# Patient Record
Sex: FEMALE | Race: WHITE | NOT HISPANIC OR LATINO
[De-identification: names, ages, dates, MRNs, and addresses within clinical notes are randomized per-mention and may not be internally consistent; named-entity substitution may affect disease eponyms.]

---

## 2020-04-07 ENCOUNTER — APPOINTMENT (OUTPATIENT)
Dept: GASTROENTEROLOGY | Facility: CLINIC | Age: 85
End: 2020-04-07

## 2020-04-09 ENCOUNTER — APPOINTMENT (OUTPATIENT)
Dept: INTERNAL MEDICINE | Facility: CLINIC | Age: 85
End: 2020-04-09

## 2020-08-05 ENCOUNTER — EMERGENCY (EMERGENCY)
Facility: HOSPITAL | Age: 85
LOS: 0 days | Discharge: HOME | End: 2020-08-05
Payer: MEDICARE

## 2020-08-05 VITALS
RESPIRATION RATE: 20 BRPM | SYSTOLIC BLOOD PRESSURE: 151 MMHG | TEMPERATURE: 98 F | OXYGEN SATURATION: 98 % | HEART RATE: 67 BPM | DIASTOLIC BLOOD PRESSURE: 61 MMHG

## 2020-08-05 DIAGNOSIS — Z88.0 ALLERGY STATUS TO PENICILLIN: ICD-10-CM

## 2020-08-05 DIAGNOSIS — I10 ESSENTIAL (PRIMARY) HYPERTENSION: ICD-10-CM

## 2020-08-05 DIAGNOSIS — Z11.59 ENCOUNTER FOR SCREENING FOR OTHER VIRAL DISEASES: ICD-10-CM

## 2020-08-05 DIAGNOSIS — M81.0 AGE-RELATED OSTEOPOROSIS WITHOUT CURRENT PATHOLOGICAL FRACTURE: ICD-10-CM

## 2020-08-05 DIAGNOSIS — Z88.1 ALLERGY STATUS TO OTHER ANTIBIOTIC AGENTS STATUS: ICD-10-CM

## 2020-08-05 PROCEDURE — 99283 EMERGENCY DEPT VISIT LOW MDM: CPT

## 2020-08-05 PROCEDURE — U0003: CPT

## 2020-08-05 PROCEDURE — 99282 EMERGENCY DEPT VISIT SF MDM: CPT

## 2020-08-05 NOTE — ED PROVIDER NOTE - NSFOLLOWUPINSTRUCTIONS_ED_ALL_ED_FT
How to get your Coronavirus (COVID-19) Testing Results:   Please be advised that you were tested for the coronavirus (COVID-19) in the Emergency Department at [facility].  You are to maintain self-quarantine procedures for 14 days until instructed otherwise by one of our healthcare agents. Please note that the test may take up to 2-4 days to result.  If you do not hear from us within 72 hours and you'd like to check on your results, you can call on of our coronavirus specialists at [address] [place] [address] (available 24/7).  Please DO NOT call the site where you received the test to obtain your results.

## 2020-08-05 NOTE — ED PROVIDER NOTE - OBJECTIVE STATEMENT
Pt presents for COVID testing required prior to moving into an assisted living pt is asymptomatic and has no acute complaints at this time.

## 2020-08-05 NOTE — ED PROVIDER NOTE - PATIENT PORTAL LINK FT
You can access the FollowMyHealth Patient Portal offered by [facility] by registering at the following website: [email or web address]. By joining [facility] [employer]’s FollowMyHealth portal, you will also be able to view your health information using other applications (apps) compatible with our system.

## 2020-08-05 NOTE — ED ADULT TRIAGE NOTE - CHIEF COMPLAINT QUOTE
Patient presents for COVID-19 testing; complains of possible covid exposure, requesting medical evaluation

## 2020-08-06 LAB — SARS-COV-2 RNA SPEC QL NAA+PROBE: SIGNIFICANT CHANGE UP

## 2020-08-25 ENCOUNTER — APPOINTMENT (OUTPATIENT)
Dept: GASTROENTEROLOGY | Facility: CLINIC | Age: 85
End: 2020-08-25
Payer: MEDICARE

## 2020-08-25 VITALS
HEIGHT: 59 IN | WEIGHT: 109 LBS | HEART RATE: 58 BPM | BODY MASS INDEX: 21.97 KG/M2 | DIASTOLIC BLOOD PRESSURE: 79 MMHG | OXYGEN SATURATION: 95 % | SYSTOLIC BLOOD PRESSURE: 202 MMHG

## 2020-08-25 LAB
ALBUMIN SERPL ELPH-MCNC: 4 G/DL
ALP BLD-CCNC: 108 U/L
ALT SERPL-CCNC: 22 U/L
ANION GAP SERPL CALC-SCNC: 9 MMOL/L
AST SERPL-CCNC: 27 U/L
BILIRUB SERPL-MCNC: 0.4 MG/DL
BUN SERPL-MCNC: 22 MG/DL
CALCIUM SERPL-MCNC: 9.5 MG/DL
CHLORIDE SERPL-SCNC: 107 MMOL/L
CO2 SERPL-SCNC: 28 MMOL/L
CREAT SERPL-MCNC: 0.71 MG/DL
CRP SERPL-MCNC: 0.17 MG/DL
GLUCOSE SERPL-MCNC: 87 MG/DL
MAGNESIUM SERPL-MCNC: 2.4 MG/DL
POTASSIUM SERPL-SCNC: 5 MMOL/L
PROT SERPL-MCNC: 6.1 G/DL
SODIUM SERPL-SCNC: 144 MMOL/L

## 2020-08-25 PROCEDURE — 99204 OFFICE O/P NEW MOD 45 MIN: CPT

## 2020-08-25 NOTE — HISTORY OF PRESENT ILLNESS
[de-identified] : Cindy is a very pleasant 87 year old female presenting for evaluation iarrhea abdominal pain.She has a diagnosis of Crohn's disease, diagnosed about 9 months ago. At that time, she was hospitalized with GI bleed. She is having diarrhea complicated by hematochezia. She underwent endoscopic evaluation and colonoscopy in [place]. She had, per patient and her son, diagnosis of Crohn's disease requiring initiation of stone or after a course of prednisone. She had been doing well. She received her infusion and started the injection therapy in [place].She now reports that for the past several weeks, she has had recurrent lower abdominal cramping pain and loose stool. Intermittent diarrhea and food intolerance. She had some bloating and decreased appetite, mainly feared that her diarrhea will come back As a result, she has been limiting for choices.She is here to establish care now that she will be residing in [place].\par \par

## 2020-08-25 NOTE — ASSESSMENT
[FreeTextEntry1] : Cindy is a very pleasant 87 year old female presenting for evaluation of  diarrhea and bloating. She has had lower abdominal discomfort associated with loose bowel movements. She had much more significant diarrhea about 9 months ago, which resulted in hospitalization for hematochezia. She was diagnosed at that time with Crohn's disease and placed on sclerae. She will likely need to resume this\par \par For what sounds like Crohn's colitis. However, I have her quested her records from [place] and we will wait to review both the endoscopy records and the pathology In addition, I wasn't sure that she is adequate immunization. Lastly, the first step will be to rule out colitis. She will have stool testing today in addition to lab testing. She is given instructions for a modified diet and we will await the results of the stool tests with further instructions.\par \par

## 2020-08-25 NOTE — PHYSICAL EXAM
[General Appearance - Alert] : alert [General Appearance - In No Acute Distress] : in no acute distress [Outer Ear] : the ears and nose were normal in appearance [Neck Cervical Mass (___cm)] : no neck mass was observed [Oropharynx] : the oropharynx was normal [Neck Appearance] : the appearance of the neck was normal [Thyroid Diffuse Enlargement] : the thyroid was not enlarged [Jugular Venous Distention Increased] : there was no jugular-venous distention [Thyroid Nodule] : there were no palpable thyroid nodules [Auscultation Breath Sounds / Voice Sounds] : lungs were clear to auscultation bilaterally [Heart Rate And Rhythm] : heart rate was normal and rhythm regular [Murmurs] : no murmurs [Heart Sounds Gallop] : no gallops [Heart Sounds] : normal S1 and S2 [Edema] : there was no peripheral edema [Full Pulse] : the pedal pulses are present [Heart Sounds Pericardial Friction Rub] : no pericardial rub [Bowel Sounds] : normal bowel sounds [Abdomen Soft] : soft [Abdomen Mass (___ Cm)] : no abdominal mass palpated [Abnormal Walk] : normal gait [Nail Clubbing] : no clubbing  or cyanosis of the fingernails [Musculoskeletal - Swelling] : no joint swelling seen [Skin Color & Pigmentation] : normal skin color and pigmentation [Motor Tone] : muscle strength and tone were normal [Skin Turgor] : normal skin turgor [Deep Tendon Reflexes (DTR)] : deep tendon reflexes were 2+ and symmetric [] : no rash [Sensation] : the sensory exam was normal to light touch and pinprick [Impaired Insight] : insight and judgment were intact [No Focal Deficits] : no focal deficits [Affect] : the affect was normal [Diffuse] : diffusely

## 2020-08-26 LAB
BASOPHILS # BLD AUTO: 0.03 K/UL
BASOPHILS NFR BLD AUTO: 0.4 %
ENDOMYSIUM IGA SER QL: NEGATIVE
ENDOMYSIUM IGA TITR SER: NORMAL
EOSINOPHIL # BLD AUTO: 0.23 K/UL
EOSINOPHIL NFR BLD AUTO: 2.9 %
HCT VFR BLD CALC: 45.8 %
HGB BLD-MCNC: 13.7 G/DL
IGA SER QL IEP: 245 MG/DL
IMM GRANULOCYTES NFR BLD AUTO: 0 %
LYMPHOCYTES # BLD AUTO: 2.86 K/UL
LYMPHOCYTES NFR BLD AUTO: 36.3 %
MAN DIFF?: NORMAL
MCHC RBC-ENTMCNC: 29 PG
MCHC RBC-ENTMCNC: 29.9 GM/DL
MCV RBC AUTO: 97 FL
MONOCYTES # BLD AUTO: 0.55 K/UL
MONOCYTES NFR BLD AUTO: 7 %
NEUTROPHILS # BLD AUTO: 4.21 K/UL
NEUTROPHILS NFR BLD AUTO: 53.4 %
PLATELET # BLD AUTO: 208 K/UL
RBC # BLD: 4.72 M/UL
RBC # FLD: 14.6 %
TTG IGA SER IA-ACNC: <1.2 U/ML
TTG IGA SER-ACNC: NEGATIVE
WBC # FLD AUTO: 7.88 K/UL

## 2020-09-01 LAB
C DIFF TOXIN B QL PCR REFLEX: NORMAL
GDH ANTIGEN: NOT DETECTED
TOXIN A AND B: NOT DETECTED

## 2020-09-08 ENCOUNTER — TRANSCRIPTION ENCOUNTER (OUTPATIENT)
Age: 85
End: 2020-09-08

## 2020-09-08 LAB — CALPROTECTIN FECAL: 46 UG/G

## 2020-09-23 ENCOUNTER — APPOINTMENT (OUTPATIENT)
Dept: INTERNAL MEDICINE | Facility: CLINIC | Age: 85
End: 2020-09-23
Payer: MEDICARE

## 2020-09-23 ENCOUNTER — NON-APPOINTMENT (OUTPATIENT)
Age: 85
End: 2020-09-23

## 2020-09-23 VITALS
BODY MASS INDEX: 22.18 KG/M2 | TEMPERATURE: 97.8 F | SYSTOLIC BLOOD PRESSURE: 148 MMHG | WEIGHT: 110 LBS | HEART RATE: 68 BPM | OXYGEN SATURATION: 95 % | HEIGHT: 59 IN | DIASTOLIC BLOOD PRESSURE: 82 MMHG | RESPIRATION RATE: 14 BRPM

## 2020-09-23 DIAGNOSIS — Z82.3 FAMILY HISTORY OF STROKE: ICD-10-CM

## 2020-09-23 DIAGNOSIS — Z80.1 FAMILY HISTORY OF MALIGNANT NEOPLASM OF TRACHEA, BRONCHUS AND LUNG: ICD-10-CM

## 2020-09-23 PROCEDURE — 90732 PPSV23 VACC 2 YRS+ SUBQ/IM: CPT

## 2020-09-23 PROCEDURE — G0009: CPT

## 2020-09-23 PROCEDURE — 99203 OFFICE O/P NEW LOW 30 MIN: CPT | Mod: 25

## 2020-09-23 NOTE — HISTORY OF PRESENT ILLNESS
[FreeTextEntry1] : establish care [de-identified] : Moved from [place].\alysia Was hospitalized in December and January. First for bronchitis and then bleeding and Crohn's exacerbation. \par she continues to have diarrhea. \par  1.5 yrs ago.

## 2020-09-26 LAB
ALBUMIN SERPL ELPH-MCNC: 4.1 G/DL
ALP BLD-CCNC: 111 U/L
ALT SERPL-CCNC: 20 U/L
ANION GAP SERPL CALC-SCNC: 7 MMOL/L
AST SERPL-CCNC: 28 U/L
BASOPHILS # BLD AUTO: 0.05 K/UL
BASOPHILS NFR BLD AUTO: 0.6 %
BILIRUB SERPL-MCNC: 0.5 MG/DL
BUN SERPL-MCNC: 24 MG/DL
CALCIUM SERPL-MCNC: 9.5 MG/DL
CHLORIDE SERPL-SCNC: 106 MMOL/L
CHOLEST SERPL-MCNC: 175 MG/DL
CHOLEST/HDLC SERPL: 2.2 RATIO
CO2 SERPL-SCNC: 31 MMOL/L
CREAT SERPL-MCNC: 0.79 MG/DL
EOSINOPHIL # BLD AUTO: 0.15 K/UL
EOSINOPHIL NFR BLD AUTO: 1.9 %
ESTIMATED AVERAGE GLUCOSE: 108 MG/DL
FOLATE SERPL-MCNC: >20 NG/ML
GLUCOSE SERPL-MCNC: 90 MG/DL
HBA1C MFR BLD HPLC: 5.4 %
HCT VFR BLD CALC: 47.1 %
HDLC SERPL-MCNC: 81 MG/DL
HGB BLD-MCNC: 14.8 G/DL
IMM GRANULOCYTES NFR BLD AUTO: 0.1 %
LDLC SERPL CALC-MCNC: 77 MG/DL
LYMPHOCYTES # BLD AUTO: 2.44 K/UL
LYMPHOCYTES NFR BLD AUTO: 31.2 %
MAN DIFF?: NORMAL
MCHC RBC-ENTMCNC: 30.1 PG
MCHC RBC-ENTMCNC: 31.4 GM/DL
MCV RBC AUTO: 95.7 FL
MONOCYTES # BLD AUTO: 0.47 K/UL
MONOCYTES NFR BLD AUTO: 6 %
NEUTROPHILS # BLD AUTO: 4.69 K/UL
NEUTROPHILS NFR BLD AUTO: 60.2 %
PLATELET # BLD AUTO: 214 K/UL
POTASSIUM SERPL-SCNC: 4.5 MMOL/L
PROT SERPL-MCNC: 6.4 G/DL
RBC # BLD: 4.92 M/UL
RBC # FLD: 13.7 %
SARS-COV-2 IGG SERPL IA-ACNC: 0.09 INDEX
SARS-COV-2 IGG SERPL QL IA: NEGATIVE
SODIUM SERPL-SCNC: 144 MMOL/L
TRIGL SERPL-MCNC: 83 MG/DL
TSH SERPL-ACNC: 3.3 UIU/ML
VIT B12 SERPL-MCNC: >2000 PG/ML
WBC # FLD AUTO: 7.81 K/UL

## 2020-10-21 ENCOUNTER — APPOINTMENT (OUTPATIENT)
Dept: ULTRASOUND IMAGING | Facility: CLINIC | Age: 85
End: 2020-10-21
Payer: MEDICARE

## 2020-10-21 ENCOUNTER — OUTPATIENT (OUTPATIENT)
Dept: OUTPATIENT SERVICES | Facility: HOSPITAL | Age: 85
LOS: 1 days | End: 2020-10-21
Payer: MEDICARE

## 2020-10-21 ENCOUNTER — APPOINTMENT (OUTPATIENT)
Dept: INTERNAL MEDICINE | Facility: CLINIC | Age: 85
End: 2020-10-21
Payer: MEDICARE

## 2020-10-21 VITALS
RESPIRATION RATE: 14 BRPM | HEIGHT: 59 IN | HEART RATE: 55 BPM | DIASTOLIC BLOOD PRESSURE: 92 MMHG | WEIGHT: 111 LBS | SYSTOLIC BLOOD PRESSURE: 154 MMHG | OXYGEN SATURATION: 96 % | BODY MASS INDEX: 22.38 KG/M2 | TEMPERATURE: 97.6 F

## 2020-10-21 DIAGNOSIS — Z00.8 ENCOUNTER FOR OTHER GENERAL EXAMINATION: ICD-10-CM

## 2020-10-21 PROCEDURE — 99214 OFFICE O/P EST MOD 30 MIN: CPT

## 2020-10-21 PROCEDURE — 76705 ECHO EXAM OF ABDOMEN: CPT

## 2020-10-21 PROCEDURE — 76705 ECHO EXAM OF ABDOMEN: CPT | Mod: 26

## 2020-10-21 NOTE — HISTORY OF PRESENT ILLNESS
[Other: _____] : [unfilled] [FreeTextEntry1] : BP check [de-identified] : Pt has been having high BP at home. She went to  for shots for her Crohn's and her BP was very high. Got home and 240/110. Her mother and aunt had strokes so pt was very concerned. She doubled up on losartan and BP is a little better. \par Found a lump on her left side and nervous so her BP is high from that. She has some upper left abdominal discomfort for months. Recently felt a painless lump in her groin. \par c/o urinary urgency and can't make it to the bathroom almost. Sometimes a little burning afterwards and has frequency. Has been 2 weeks. \par Her son is a radiologist in [place] and other son a podiatrist.

## 2020-10-21 NOTE — PHYSICAL EXAM
[Normal] : affect was normal and insight and judgment were intact [de-identified] : firm nonreducible mass left groin

## 2020-10-22 LAB
APPEARANCE: CLEAR
BACTERIA: NEGATIVE
BILIRUBIN URINE: NEGATIVE
BLOOD URINE: NORMAL
COLOR: COLORLESS
GLUCOSE QUALITATIVE U: NEGATIVE
HYALINE CASTS: 1 /LPF
KETONES URINE: NEGATIVE
LEUKOCYTE ESTERASE URINE: NEGATIVE
MICROSCOPIC-UA: NORMAL
NITRITE URINE: NEGATIVE
PH URINE: 7
PROTEIN URINE: NEGATIVE
RED BLOOD CELLS URINE: 3 /HPF
SPECIFIC GRAVITY URINE: 1
SQUAMOUS EPITHELIAL CELLS: 1 /HPF
UROBILINOGEN URINE: NORMAL
WHITE BLOOD CELLS URINE: 1 /HPF

## 2020-10-23 ENCOUNTER — NON-APPOINTMENT (OUTPATIENT)
Age: 85
End: 2020-10-23

## 2020-10-23 LAB — BACTERIA UR CULT: NORMAL

## 2020-12-18 ENCOUNTER — NON-APPOINTMENT (OUTPATIENT)
Age: 85
End: 2020-12-18

## 2020-12-23 ENCOUNTER — APPOINTMENT (OUTPATIENT)
Dept: INTERNAL MEDICINE | Facility: CLINIC | Age: 85
End: 2020-12-23
Payer: MEDICARE

## 2020-12-23 VITALS
HEART RATE: 60 BPM | RESPIRATION RATE: 14 BRPM | DIASTOLIC BLOOD PRESSURE: 64 MMHG | TEMPERATURE: 97.8 F | BODY MASS INDEX: 21.77 KG/M2 | OXYGEN SATURATION: 98 % | SYSTOLIC BLOOD PRESSURE: 126 MMHG | WEIGHT: 108 LBS | HEIGHT: 59 IN

## 2020-12-23 PROCEDURE — 99213 OFFICE O/P EST LOW 20 MIN: CPT

## 2020-12-23 NOTE — HISTORY OF PRESENT ILLNESS
[FreeTextEntry1] : BP check [de-identified] : Pt is here for BP check. She takes her meds everyday. Has her home readings and tends to run high in the mornings. She has been eating a low salt diet.

## 2020-12-23 NOTE — PHYSICAL EXAM
[Normal] : affect was normal and insight and judgment were intact [de-identified] : left groin with soft bulge

## 2021-02-16 ENCOUNTER — TRANSCRIPTION ENCOUNTER (OUTPATIENT)
Age: 86
End: 2021-02-16

## 2021-02-17 ENCOUNTER — APPOINTMENT (OUTPATIENT)
Dept: INTERNAL MEDICINE | Facility: CLINIC | Age: 86
End: 2021-02-17
Payer: MEDICARE

## 2021-02-17 VITALS — SYSTOLIC BLOOD PRESSURE: 178 MMHG | DIASTOLIC BLOOD PRESSURE: 80 MMHG

## 2021-02-17 VITALS
WEIGHT: 112 LBS | SYSTOLIC BLOOD PRESSURE: 152 MMHG | BODY MASS INDEX: 22.58 KG/M2 | OXYGEN SATURATION: 98 % | HEART RATE: 84 BPM | RESPIRATION RATE: 14 BRPM | DIASTOLIC BLOOD PRESSURE: 88 MMHG | HEIGHT: 59 IN | TEMPERATURE: 98.2 F

## 2021-02-17 PROCEDURE — 99214 OFFICE O/P EST MOD 30 MIN: CPT
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
## 2021-02-17 NOTE — PHYSICAL EXAM
[No Edema] : there was no peripheral edema [Normal] : affect was normal and insight and judgment were intact [de-identified] : left buttocks to left leg pain

## 2021-02-18 ENCOUNTER — RX RENEWAL (OUTPATIENT)
Age: 86
End: 2021-02-18

## 2021-02-23 ENCOUNTER — NON-APPOINTMENT (OUTPATIENT)
Age: 86
End: 2021-02-23

## 2021-02-23 ENCOUNTER — APPOINTMENT (OUTPATIENT)
Dept: CARDIOLOGY | Facility: CLINIC | Age: 86
End: 2021-02-23
Payer: MEDICARE

## 2021-02-23 VITALS
SYSTOLIC BLOOD PRESSURE: 196 MMHG | HEART RATE: 77 BPM | BODY MASS INDEX: 23.63 KG/M2 | WEIGHT: 117 LBS | OXYGEN SATURATION: 94 % | DIASTOLIC BLOOD PRESSURE: 72 MMHG

## 2021-02-23 VITALS — DIASTOLIC BLOOD PRESSURE: 60 MMHG | SYSTOLIC BLOOD PRESSURE: 140 MMHG

## 2021-02-23 DIAGNOSIS — Z87.891 PERSONAL HISTORY OF NICOTINE DEPENDENCE: ICD-10-CM

## 2021-02-23 DIAGNOSIS — R94.31 ABNORMAL ELECTROCARDIOGRAM [ECG] [EKG]: ICD-10-CM

## 2021-02-23 PROCEDURE — 93000 ELECTROCARDIOGRAM COMPLETE: CPT

## 2021-02-23 PROCEDURE — 99204 OFFICE O/P NEW MOD 45 MIN: CPT

## 2021-03-02 ENCOUNTER — APPOINTMENT (OUTPATIENT)
Dept: CARDIOLOGY | Facility: CLINIC | Age: 86
End: 2021-03-02
Payer: MEDICARE

## 2021-03-02 PROCEDURE — 93790 AMBL BP MNTR W/SW I&R: CPT

## 2021-03-03 ENCOUNTER — APPOINTMENT (OUTPATIENT)
Dept: CARDIOLOGY | Facility: CLINIC | Age: 86
End: 2021-03-03
Payer: MEDICARE

## 2021-03-03 PROCEDURE — 93306 TTE W/DOPPLER COMPLETE: CPT

## 2021-03-23 ENCOUNTER — APPOINTMENT (OUTPATIENT)
Dept: CARDIOLOGY | Facility: CLINIC | Age: 86
End: 2021-03-23
Payer: MEDICARE

## 2021-03-23 ENCOUNTER — NON-APPOINTMENT (OUTPATIENT)
Age: 86
End: 2021-03-23

## 2021-03-23 VITALS
HEIGHT: 59 IN | HEART RATE: 70 BPM | SYSTOLIC BLOOD PRESSURE: 177 MMHG | BODY MASS INDEX: 23.79 KG/M2 | DIASTOLIC BLOOD PRESSURE: 78 MMHG | WEIGHT: 118 LBS | OXYGEN SATURATION: 97 %

## 2021-03-23 PROCEDURE — 99215 OFFICE O/P EST HI 40 MIN: CPT

## 2021-03-23 PROCEDURE — 93000 ELECTROCARDIOGRAM COMPLETE: CPT

## 2021-03-24 ENCOUNTER — APPOINTMENT (OUTPATIENT)
Dept: INTERNAL MEDICINE | Facility: CLINIC | Age: 86
End: 2021-03-24
Payer: MEDICARE

## 2021-03-24 VITALS
SYSTOLIC BLOOD PRESSURE: 146 MMHG | TEMPERATURE: 97.2 F | RESPIRATION RATE: 14 BRPM | HEART RATE: 72 BPM | DIASTOLIC BLOOD PRESSURE: 80 MMHG | OXYGEN SATURATION: 94 % | WEIGHT: 114 LBS | BODY MASS INDEX: 23.03 KG/M2

## 2021-03-24 DIAGNOSIS — M54.32 SCIATICA, LEFT SIDE: ICD-10-CM

## 2021-03-24 PROCEDURE — 99214 OFFICE O/P EST MOD 30 MIN: CPT

## 2021-03-24 NOTE — HISTORY OF PRESENT ILLNESS
[FreeTextEntry1] : BP check [de-identified] : Pt has high BP. She was seen by cardiology. c/o back pain into her left leg, front and back. Has had this before. It comes and goes and positional. Feels it especially at night. Has it for 3 months.

## 2021-04-07 ENCOUNTER — APPOINTMENT (OUTPATIENT)
Dept: RADIOLOGY | Facility: CLINIC | Age: 86
End: 2021-04-07
Payer: MEDICARE

## 2021-04-07 ENCOUNTER — OUTPATIENT (OUTPATIENT)
Dept: OUTPATIENT SERVICES | Facility: HOSPITAL | Age: 86
LOS: 1 days | End: 2021-04-07
Payer: MEDICARE

## 2021-04-07 DIAGNOSIS — M54.32 SCIATICA, LEFT SIDE: ICD-10-CM

## 2021-04-07 PROCEDURE — 72100 X-RAY EXAM L-S SPINE 2/3 VWS: CPT

## 2021-04-07 PROCEDURE — 72100 X-RAY EXAM L-S SPINE 2/3 VWS: CPT | Mod: 26

## 2021-04-24 ENCOUNTER — APPOINTMENT (OUTPATIENT)
Dept: MRI IMAGING | Facility: CLINIC | Age: 86
End: 2021-04-24
Payer: MEDICARE

## 2021-04-24 ENCOUNTER — OUTPATIENT (OUTPATIENT)
Dept: OUTPATIENT SERVICES | Facility: HOSPITAL | Age: 86
LOS: 1 days | End: 2021-04-24
Payer: MEDICARE

## 2021-04-24 DIAGNOSIS — M54.16 RADICULOPATHY, LUMBAR REGION: ICD-10-CM

## 2021-04-24 DIAGNOSIS — Z00.8 ENCOUNTER FOR OTHER GENERAL EXAMINATION: ICD-10-CM

## 2021-04-24 PROCEDURE — 72148 MRI LUMBAR SPINE W/O DYE: CPT

## 2021-04-24 PROCEDURE — 72148 MRI LUMBAR SPINE W/O DYE: CPT | Mod: 26,MH

## 2021-05-02 ENCOUNTER — RX RENEWAL (OUTPATIENT)
Age: 86
End: 2021-05-02

## 2021-05-11 ENCOUNTER — RX RENEWAL (OUTPATIENT)
Age: 86
End: 2021-05-11

## 2021-06-23 ENCOUNTER — APPOINTMENT (OUTPATIENT)
Dept: INTERNAL MEDICINE | Facility: CLINIC | Age: 86
End: 2021-06-23
Payer: MEDICARE

## 2021-06-23 VITALS
DIASTOLIC BLOOD PRESSURE: 80 MMHG | RESPIRATION RATE: 14 BRPM | TEMPERATURE: 97.2 F | SYSTOLIC BLOOD PRESSURE: 120 MMHG | OXYGEN SATURATION: 95 % | HEART RATE: 71 BPM

## 2021-06-23 PROCEDURE — 99214 OFFICE O/P EST MOD 30 MIN: CPT

## 2021-06-23 NOTE — PHYSICAL EXAM
[Normal] : affect was normal and insight and judgment were intact [de-identified] : trace ankle edema

## 2021-06-23 NOTE — HISTORY OF PRESENT ILLNESS
[FreeTextEntry1] : BP check [de-identified] : Monitors home BP. Higher in the mornings 150's/. Ankles swell  a little as day goes on. \par Has back pain and sees ortho. had mri and incidentally saw dilated bile duct.

## 2021-07-02 ENCOUNTER — APPOINTMENT (OUTPATIENT)
Dept: ULTRASOUND IMAGING | Facility: CLINIC | Age: 86
End: 2021-07-02

## 2021-07-05 ENCOUNTER — OUTPATIENT (OUTPATIENT)
Dept: OUTPATIENT SERVICES | Facility: HOSPITAL | Age: 86
LOS: 1 days | End: 2021-07-05
Payer: MEDICARE

## 2021-07-05 ENCOUNTER — APPOINTMENT (OUTPATIENT)
Dept: ULTRASOUND IMAGING | Facility: CLINIC | Age: 86
End: 2021-07-05
Payer: MEDICARE

## 2021-07-05 DIAGNOSIS — K83.8 OTHER SPECIFIED DISEASES OF BILIARY TRACT: ICD-10-CM

## 2021-07-05 PROCEDURE — 76700 US EXAM ABDOM COMPLETE: CPT

## 2021-07-05 PROCEDURE — 76700 US EXAM ABDOM COMPLETE: CPT | Mod: 26

## 2021-08-17 ENCOUNTER — APPOINTMENT (OUTPATIENT)
Dept: GASTROENTEROLOGY | Facility: CLINIC | Age: 86
End: 2021-08-17
Payer: MEDICARE

## 2021-08-17 VITALS
WEIGHT: 117 LBS | HEIGHT: 59 IN | BODY MASS INDEX: 23.59 KG/M2 | OXYGEN SATURATION: 94 % | SYSTOLIC BLOOD PRESSURE: 158 MMHG | DIASTOLIC BLOOD PRESSURE: 84 MMHG | HEART RATE: 68 BPM

## 2021-08-17 DIAGNOSIS — Z23 ENCOUNTER FOR IMMUNIZATION: ICD-10-CM

## 2021-08-17 PROCEDURE — 99214 OFFICE O/P EST MOD 30 MIN: CPT

## 2021-08-18 PROBLEM — Z23 ENCOUNTER FOR IMMUNIZATION: Status: ACTIVE | Noted: 2020-09-23

## 2021-08-18 NOTE — ASSESSMENT
[FreeTextEntry1] : Cindy is a very pleasant 88 year old female presenting for follow up for Crohn's colitis. She has had good tolerance of the medication and has been in remission. prior to starting Stelara (about 2 years ago) she was having severe diarrhea and lower abdominal pain. She was also having bleeding and was hospitalized for hematochezia. no recurrent bleeding and labs stable .She has had intermittent mild diarrhea and she will have fecal calprotectin to look for active inflammation. last CRP normal 7/24/21. She will have a UA/ Cx and follow up with her PCP. \par \par Reviewed and reconciled medications, allergies, PMHx, PSHx, SocHx, FMHx. Reviewed imaging, blood work, diagnostic testing, discussed with patient. Further recommendations pending results of above work up and evaluation.\par \par

## 2021-08-18 NOTE — CONSULT LETTER
[Dear  ___] : Dear  [unfilled], [Consult Letter:] : I had the pleasure of evaluating your patient, [unfilled]. [Consult Closing:] : Thank you very much for allowing me to participate in the care of this patient.  If you have any questions, please do not hesitate to contact me. [Sincerely,] : Sincerely, [FreeTextEntry1] : Cindy is a very pleasant 88 year old female presenting for follow up for Crohn's colitis. She has had good tolerance of the medication and has been in remission. prior to starting Stelara (about 2 years ago) she was having severe diarrhea and lower abdominal pain. She was also having bleeding and was hospitalized for hematochezia. no recurrent bleeding and labs stable .She has had intermittent mild diarrhea and she will have fecal calprotectin to look for active inflammation. last CRP normal 7/24/21. She will have a UA/ Cx and follow up with her PCP. \par \par Reviewed and reconciled medications, allergies, PMHx, PSHx, SocHx, FMHx. Reviewed imaging, blood work, diagnostic testing, discussed with patient. Further recommendations pending results of above work up and evaluation.\par  [FreeTextEntry3] : Nurys Rivera MD\par Gastroenterology, Hepatology and Motility\par

## 2021-08-18 NOTE — PHYSICAL EXAM
[General Appearance - Alert] : alert [General Appearance - In No Acute Distress] : in no acute distress [Outer Ear] : the ears and nose were normal in appearance [Oropharynx] : the oropharynx was normal [Neck Appearance] : the appearance of the neck was normal [Neck Cervical Mass (___cm)] : no neck mass was observed [Jugular Venous Distention Increased] : there was no jugular-venous distention [Thyroid Diffuse Enlargement] : the thyroid was not enlarged [Thyroid Nodule] : there were no palpable thyroid nodules [Auscultation Breath Sounds / Voice Sounds] : lungs were clear to auscultation bilaterally [Heart Rate And Rhythm] : heart rate was normal and rhythm regular [Heart Sounds] : normal S1 and S2 [Heart Sounds Gallop] : no gallops [Murmurs] : no murmurs [Heart Sounds Pericardial Friction Rub] : no pericardial rub [Full Pulse] : the pedal pulses are present [Edema] : there was no peripheral edema [Bowel Sounds] : normal bowel sounds [Abdomen Soft] : soft [Abdomen Mass (___ Cm)] : no abdominal mass palpated [Diffuse] : diffusely [Abnormal Walk] : normal gait [Nail Clubbing] : no clubbing  or cyanosis of the fingernails [Musculoskeletal - Swelling] : no joint swelling seen [Motor Tone] : muscle strength and tone were normal [Skin Color & Pigmentation] : normal skin color and pigmentation [Skin Turgor] : normal skin turgor [] : no rash [Deep Tendon Reflexes (DTR)] : deep tendon reflexes were 2+ and symmetric [Sensation] : the sensory exam was normal to light touch and pinprick [No Focal Deficits] : no focal deficits [Impaired Insight] : insight and judgment were intact [Affect] : the affect was normal

## 2021-08-18 NOTE — HISTORY OF PRESENT ILLNESS
[de-identified] : Cindy is a very pleasant 88 year old female presenting for follow up for Crohn's disease for which she is maintained on Stelara. She previously had diarrhea and abdominal pain.She now reports occasional loose stool and fecal urgency which is mild and does not persist. She does report that at times she is unable to clean after BMs, needs to keep wiping. She also reports mild urinary symptoms the past couple of days. She has not had any fevers or pain. \par \par From August 2020 note:\par She has a diagnosis of Crohn's disease, diagnosed about 9 months ago. At that time, she was hospitalized with GI bleed. She is having diarrhea complicated by hematochezia. She underwent endoscopic evaluation and colonoscopy in [place]. She had, per patient and her son, diagnosis of Crohn's disease requiring initiation of stone or after a course of prednisone. She had been doing well. She received her infusion and started the injection therapy in [place].She now reports that for the past several weeks, she has had recurrent lower abdominal cramping pain and loose stool. Intermittent diarrhea and food intolerance. She had some bloating and decreased appetite, mainly feared that her diarrhea will come back As a result, she has been limiting for choices.She is here to establish care now that she will be residing in [place].\par \par

## 2021-08-18 NOTE — REASON FOR VISIT
[Follow-Up: _____] : a [unfilled] follow-up visit [Initial Evaluation] : an initial evaluation [FreeTextEntry1] : Crohn's disease

## 2021-08-18 NOTE — REVIEW OF SYSTEMS
[Abdominal Pain] : abdominal pain [Diarrhea] : diarrhea [Negative] : Heme/Lymph [As Noted in HPI] : as noted in HPI

## 2021-08-19 LAB
APPEARANCE: CLEAR
BILIRUBIN URINE: NEGATIVE
BLOOD URINE: NEGATIVE
COLOR: COLORLESS
GLUCOSE QUALITATIVE U: NEGATIVE
KETONES URINE: NEGATIVE
LEUKOCYTE ESTERASE URINE: NEGATIVE
NITRITE URINE: NEGATIVE
PH URINE: 7
PROTEIN URINE: NEGATIVE
SPECIFIC GRAVITY URINE: 1
UROBILINOGEN URINE: NORMAL

## 2021-08-28 ENCOUNTER — RX RENEWAL (OUTPATIENT)
Age: 86
End: 2021-08-28

## 2021-08-31 LAB — CALPROTECTIN FECAL: 31 UG/G

## 2021-09-27 ENCOUNTER — NON-APPOINTMENT (OUTPATIENT)
Age: 86
End: 2021-09-27

## 2021-09-27 ENCOUNTER — APPOINTMENT (OUTPATIENT)
Dept: CARDIOLOGY | Facility: CLINIC | Age: 86
End: 2021-09-27
Payer: MEDICARE

## 2021-09-27 VITALS
WEIGHT: 116 LBS | HEIGHT: 55 IN | SYSTOLIC BLOOD PRESSURE: 164 MMHG | BODY MASS INDEX: 26.85 KG/M2 | OXYGEN SATURATION: 96 % | DIASTOLIC BLOOD PRESSURE: 73 MMHG | HEART RATE: 70 BPM

## 2021-09-27 VITALS — SYSTOLIC BLOOD PRESSURE: 122 MMHG | DIASTOLIC BLOOD PRESSURE: 60 MMHG

## 2021-09-27 PROCEDURE — 99215 OFFICE O/P EST HI 40 MIN: CPT

## 2021-09-27 PROCEDURE — 93000 ELECTROCARDIOGRAM COMPLETE: CPT

## 2021-09-29 ENCOUNTER — RX RENEWAL (OUTPATIENT)
Age: 86
End: 2021-09-29

## 2021-10-06 ENCOUNTER — APPOINTMENT (OUTPATIENT)
Dept: INTERNAL MEDICINE | Facility: CLINIC | Age: 86
End: 2021-10-06
Payer: MEDICARE

## 2021-10-06 VITALS
OXYGEN SATURATION: 94 % | TEMPERATURE: 97.1 F | RESPIRATION RATE: 14 BRPM | DIASTOLIC BLOOD PRESSURE: 70 MMHG | WEIGHT: 120 LBS | HEART RATE: 70 BPM | SYSTOLIC BLOOD PRESSURE: 130 MMHG | BODY MASS INDEX: 27.77 KG/M2 | HEIGHT: 55 IN

## 2021-10-06 VITALS — OXYGEN SATURATION: 98 %

## 2021-10-06 DIAGNOSIS — G89.29 DORSALGIA, UNSPECIFIED: ICD-10-CM

## 2021-10-06 DIAGNOSIS — M54.9 DORSALGIA, UNSPECIFIED: ICD-10-CM

## 2021-10-06 PROCEDURE — 99214 OFFICE O/P EST MOD 30 MIN: CPT

## 2021-10-06 NOTE — HISTORY OF PRESENT ILLNESS
[FreeTextEntry1] : BP check [de-identified] : Pt is here for BP check.\par c/o back pain chronically. Had mri. \par c/o skin being thin. She was on prednisone in the past.

## 2021-10-07 LAB
25(OH)D3 SERPL-MCNC: 51.5 NG/ML
ALBUMIN SERPL ELPH-MCNC: 4.2 G/DL
ALP BLD-CCNC: 113 U/L
ALT SERPL-CCNC: 18 U/L
ANION GAP SERPL CALC-SCNC: 10 MMOL/L
AST SERPL-CCNC: 25 U/L
BASOPHILS # BLD AUTO: 0.04 K/UL
BASOPHILS NFR BLD AUTO: 0.5 %
BILIRUB SERPL-MCNC: 0.6 MG/DL
BUN SERPL-MCNC: 17 MG/DL
CALCIUM SERPL-MCNC: 9.7 MG/DL
CHLORIDE SERPL-SCNC: 108 MMOL/L
CHOLEST SERPL-MCNC: 199 MG/DL
CO2 SERPL-SCNC: 28 MMOL/L
CREAT SERPL-MCNC: 0.69 MG/DL
EOSINOPHIL # BLD AUTO: 0.21 K/UL
EOSINOPHIL NFR BLD AUTO: 2.6 %
ESTIMATED AVERAGE GLUCOSE: 111 MG/DL
FOLATE SERPL-MCNC: >20 NG/ML
GLUCOSE SERPL-MCNC: 92 MG/DL
HBA1C MFR BLD HPLC: 5.5 %
HCT VFR BLD CALC: 47.7 %
HDLC SERPL-MCNC: 74 MG/DL
HGB BLD-MCNC: 15.3 G/DL
IMM GRANULOCYTES NFR BLD AUTO: 0.1 %
LDLC SERPL CALC-MCNC: 103 MG/DL
LYMPHOCYTES # BLD AUTO: 2.68 K/UL
LYMPHOCYTES NFR BLD AUTO: 32.8 %
MAN DIFF?: NORMAL
MCHC RBC-ENTMCNC: 30.1 PG
MCHC RBC-ENTMCNC: 32.1 GM/DL
MCV RBC AUTO: 93.9 FL
MONOCYTES # BLD AUTO: 0.56 K/UL
MONOCYTES NFR BLD AUTO: 6.9 %
NEUTROPHILS # BLD AUTO: 4.66 K/UL
NEUTROPHILS NFR BLD AUTO: 57.1 %
NONHDLC SERPL-MCNC: 125 MG/DL
PLATELET # BLD AUTO: 213 K/UL
POTASSIUM SERPL-SCNC: 4.8 MMOL/L
PROT SERPL-MCNC: 6.8 G/DL
RBC # BLD: 5.08 M/UL
RBC # FLD: 13.2 %
SODIUM SERPL-SCNC: 146 MMOL/L
TRIGL SERPL-MCNC: 114 MG/DL
TSH SERPL-ACNC: 2.76 UIU/ML
URATE SERPL-MCNC: 3.4 MG/DL
VIT B12 SERPL-MCNC: 972 PG/ML
WBC # FLD AUTO: 8.16 K/UL

## 2021-10-26 ENCOUNTER — RX RENEWAL (OUTPATIENT)
Age: 86
End: 2021-10-26

## 2021-10-27 ENCOUNTER — RX RENEWAL (OUTPATIENT)
Age: 86
End: 2021-10-27

## 2021-11-23 ENCOUNTER — APPOINTMENT (OUTPATIENT)
Dept: GASTROENTEROLOGY | Facility: CLINIC | Age: 86
End: 2021-11-23
Payer: MEDICARE

## 2021-11-23 ENCOUNTER — RX RENEWAL (OUTPATIENT)
Age: 86
End: 2021-11-23

## 2021-11-23 VITALS — OXYGEN SATURATION: 94 % | HEART RATE: 66 BPM | DIASTOLIC BLOOD PRESSURE: 77 MMHG | SYSTOLIC BLOOD PRESSURE: 144 MMHG

## 2021-11-23 VITALS
WEIGHT: 121 LBS | HEART RATE: 66 BPM | BODY MASS INDEX: 28 KG/M2 | DIASTOLIC BLOOD PRESSURE: 76 MMHG | SYSTOLIC BLOOD PRESSURE: 152 MMHG | OXYGEN SATURATION: 94 % | HEIGHT: 55 IN

## 2021-11-23 PROCEDURE — 99215 OFFICE O/P EST HI 40 MIN: CPT

## 2021-11-23 NOTE — CONSULT LETTER
[Dear  ___] : Dear  [unfilled], [Consult Closing:] : Thank you very much for allowing me to participate in the care of this patient.  If you have any questions, please do not hesitate to contact me. [Sincerely,] : Sincerely, [Courtesy Letter:] : I had the pleasure of seeing your patient, [unfilled], in my office today. [FreeTextEntry1] : Cindy is a very pleasant 89 year old female presenting for follow up for Crohn's colitis. She has had good tolerance of Stelara and has been in remission. Se reported that Medicare stopped covering the cost of Stellara. Out office MA Stephanie entered [place] for the Stelara with applications to 'Cover my Meds" and the request for continuation from 9/27/21 was approved. Cindy will continue to get the injections . She will have lab monitoring through the infusion center. \par I have emphasized the importance of having evaluation if she has recurrent diarrhea. She will need to have testing, particularly important would be checking stool for CDiff since she is susceptible to C Diff colitis and the symptoms could mimic Crohn's flare. \par \par Reviewed and reconciled medications, allergies, PMHx, PSHx, SocHx, FMHx. Reviewed imaging, blood work, diagnostic testing, discussed with patient. Further recommendations pending results of above work up and evaluation.\par  [FreeTextEntry3] : Nurys Rivera MD\par Gastroenterology, Hepatology and Motility\par

## 2021-11-23 NOTE — HISTORY OF PRESENT ILLNESS
[de-identified] : Cindy is a very pleasant 89 year old female presenting for follow up for Crohn's disease for which she is maintained on Stelara. She previously had severe diarrhea and abdominal pain.She now reports occasional loose stool and fecal urgency which is mild and does not persist. She does report that at times she is unable to clean after BMs, needs to keep wiping. she developed loose stool about 3 weeks ago and was advised by DIL to follow a gluten free diet. this has helped but not eliminated the issue. She denies bleeding. no fevers, abdominal pain. denies liquid stool (formed)\par \par From August 2020 note:\par She has a diagnosis of Crohn's disease, diagnosed about 9 months ago. At that time, she was hospitalized with GI bleed. She is having diarrhea complicated by hematochezia. She underwent endoscopic evaluation and colonoscopy in [place]. She had, per patient and her son, diagnosis of Crohn's disease requiring initiation of stone or after a course of prednisone. She had been doing well. She received her infusion and started the injection therapy in [place].She now reports that for the past several weeks, she has had recurrent lower abdominal cramping pain and loose stool. Intermittent diarrhea and food intolerance. She had some bloating and decreased appetite, mainly feared that her diarrhea will come back As a result, she has been limiting for choices.She is here to establish care now that she will be residing in [place].\par \par

## 2021-11-23 NOTE — REVIEW OF SYSTEMS
[Abdominal Pain] : abdominal pain [Diarrhea] : diarrhea [As Noted in HPI] : as noted in HPI [Negative] : Heme/Lymph

## 2021-11-23 NOTE — REASON FOR VISIT
[Follow-Up: _____] : a [unfilled] follow-up visit [FreeTextEntry1] : diarrhea, fecal urgency, Crohn's colitis

## 2021-11-23 NOTE — ASSESSMENT
[FreeTextEntry1] : Cindy is a very pleasant 89 year old female presenting for follow up for Crohn's colitis. She has had good tolerance of Stelara and has been in remission. Se reported that Medicare stopped covering the cost of Stellara. Out office MA Stephanie entered [place] for the Stelara with applications to 'Cover my Meds" and the request for continuation from 9/27/21 was approved. Cindy will continue to get the injections . She will have lab monitoring through the infusion center. \par I have emphasized the importance of having evaluation if she has recurrent diarrhea. She will need to have testing, particularly important would be checking stool for CDiff since she is susceptible to C Diff colitis and the symptoms could mimic Crohn's flare. \par \par Reviewed and reconciled medications, allergies, PMHx, PSHx, SocHx, FMHx. Reviewed imaging, blood work, diagnostic testing, discussed with patient. Further recommendations pending results of above work up and evaluation.\par

## 2022-01-05 ENCOUNTER — APPOINTMENT (OUTPATIENT)
Dept: INTERNAL MEDICINE | Facility: CLINIC | Age: 87
End: 2022-01-05
Payer: MEDICARE

## 2022-01-05 VITALS
BODY MASS INDEX: 24.19 KG/M2 | HEIGHT: 59 IN | SYSTOLIC BLOOD PRESSURE: 140 MMHG | DIASTOLIC BLOOD PRESSURE: 80 MMHG | RESPIRATION RATE: 14 BRPM | OXYGEN SATURATION: 98 % | WEIGHT: 120 LBS | HEART RATE: 71 BPM

## 2022-01-05 PROCEDURE — G0439: CPT

## 2022-01-05 NOTE — PHYSICAL EXAM
[No Acute Distress] : no acute distress [Well Nourished] : well nourished [Well Developed] : well developed [Well-Appearing] : well-appearing [Normal] : no acute distress, well nourished, well developed and well-appearing [Normal Sclera/Conjunctiva] : normal sclera/conjunctiva [PERRL] : pupils equal round and reactive to light [EOMI] : extraocular movements intact [Normal Outer Ear/Nose] : the outer ears and nose were normal in appearance [Normal Oropharynx] : the oropharynx was normal [No JVD] : no jugular venous distention [No Lymphadenopathy] : no lymphadenopathy [Supple] : supple [Thyroid Normal, No Nodules] : the thyroid was normal and there were no nodules present [No Respiratory Distress] : no respiratory distress  [No Accessory Muscle Use] : no accessory muscle use [Clear to Auscultation] : lungs were clear to auscultation bilaterally [Normal Rate] : normal rate  [Regular Rhythm] : with a regular rhythm [Normal S1, S2] : normal S1 and S2 [No Murmur] : no murmur heard [No Carotid Bruits] : no carotid bruits [No Abdominal Bruit] : a ~M bruit was not heard ~T in the abdomen [No Varicosities] : no varicosities [Pedal Pulses Present] : the pedal pulses are present [No Edema] : there was no peripheral edema [No Palpable Aorta] : no palpable aorta [No Extremity Clubbing/Cyanosis] : no extremity clubbing/cyanosis [Normal Appearance] : normal in appearance [Soft] : abdomen soft [Non Tender] : non-tender [Non-distended] : non-distended [No Masses] : no abdominal mass palpated [No HSM] : no HSM [Normal Bowel Sounds] : normal bowel sounds [Normal Posterior Cervical Nodes] : no posterior cervical lymphadenopathy [Normal Anterior Cervical Nodes] : no anterior cervical lymphadenopathy [No CVA Tenderness] : no CVA  tenderness [No Spinal Tenderness] : no spinal tenderness [No Joint Swelling] : no joint swelling [Grossly Normal Strength/Tone] : grossly normal strength/tone [No Rash] : no rash [Coordination Grossly Intact] : coordination grossly intact [No Focal Deficits] : no focal deficits [Normal Gait] : normal gait [Deep Tendon Reflexes (DTR)] : deep tendon reflexes were 2+ and symmetric [Normal Affect] : the affect was normal [Normal Insight/Judgement] : insight and judgment were intact

## 2022-01-05 NOTE — HISTORY OF PRESENT ILLNESS
[de-identified] : Last week started with a scratchy throat. Her Crohn's exacerbated. 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 Had 2 negative home tests. She is feeling fine today. \par Her back pain is better than it was and sleeping much better. \alysia Had zostavax in the past. \alysia Has a cyst in her pancreas and states that it has been there for years.

## 2022-01-05 NOTE — HEALTH RISK ASSESSMENT
[Never] : Never [No] : In the past 12 months have you used drugs other than those required for medical reasons? No [0] : 2) Feeling down, depressed, or hopeless: Not at all (0) [PHQ-2 Negative - No further assessment needed] : PHQ-2 Negative - No further assessment needed [None] : None [Fully functional (bathing, dressing, toileting, transferring, walking, feeding)] : Fully functional (bathing, dressing, toileting, transferring, walking, feeding) [Fully functional (using the telephone, shopping, preparing meals, housekeeping, doing laundry, using] : Fully functional and needs no help or supervision to perform IADLs (using the telephone, shopping, preparing meals, housekeeping, doing laundry, using transportation, managing medications and managing finances) [Change in mental status noted] : No change in mental status noted [Language] : denies difficulty with language

## 2022-01-06 LAB
25(OH)D3 SERPL-MCNC: 45.5 NG/ML
ALBUMIN SERPL ELPH-MCNC: 4.3 G/DL
ALP BLD-CCNC: 104 U/L
ALT SERPL-CCNC: 16 U/L
ANION GAP SERPL CALC-SCNC: 14 MMOL/L
APPEARANCE: CLEAR
AST SERPL-CCNC: 25 U/L
BACTERIA: NEGATIVE
BASOPHILS # BLD AUTO: 0.05 K/UL
BASOPHILS NFR BLD AUTO: 0.5 %
BILIRUB SERPL-MCNC: 0.6 MG/DL
BILIRUBIN URINE: NEGATIVE
BLOOD URINE: NEGATIVE
BUN SERPL-MCNC: 23 MG/DL
CALCIUM SERPL-MCNC: 9.8 MG/DL
CHLORIDE SERPL-SCNC: 106 MMOL/L
CHOLEST SERPL-MCNC: 198 MG/DL
CO2 SERPL-SCNC: 25 MMOL/L
COLOR: NORMAL
COVID-19 NUCLEOCAPSID  GAM ANTIBODY INTERPRETATION: NEGATIVE
COVID-19 SPIKE DOMAIN ANTIBODY INTERPRETATION: POSITIVE
CREAT SERPL-MCNC: 0.78 MG/DL
EOSINOPHIL # BLD AUTO: 0.2 K/UL
EOSINOPHIL NFR BLD AUTO: 2.1 %
ESTIMATED AVERAGE GLUCOSE: 111 MG/DL
FOLATE SERPL-MCNC: >20 NG/ML
GLUCOSE QUALITATIVE U: NEGATIVE
GLUCOSE SERPL-MCNC: 82 MG/DL
HBA1C MFR BLD HPLC: 5.5 %
HCT VFR BLD CALC: 48.3 %
HDLC SERPL-MCNC: 80 MG/DL
HGB BLD-MCNC: 15.3 G/DL
HYALINE CASTS: 0 /LPF
IMM GRANULOCYTES NFR BLD AUTO: 0.2 %
KETONES URINE: NEGATIVE
LDLC SERPL CALC-MCNC: 101 MG/DL
LEUKOCYTE ESTERASE URINE: ABNORMAL
LYMPHOCYTES # BLD AUTO: 2.98 K/UL
LYMPHOCYTES NFR BLD AUTO: 30.8 %
MAN DIFF?: NORMAL
MCHC RBC-ENTMCNC: 30.1 PG
MCHC RBC-ENTMCNC: 31.7 GM/DL
MCV RBC AUTO: 94.9 FL
MICROSCOPIC-UA: NORMAL
MONOCYTES # BLD AUTO: 0.55 K/UL
MONOCYTES NFR BLD AUTO: 5.7 %
NEUTROPHILS # BLD AUTO: 5.89 K/UL
NEUTROPHILS NFR BLD AUTO: 60.7 %
NITRITE URINE: NEGATIVE
NONHDLC SERPL-MCNC: 118 MG/DL
PH URINE: 6.5
PLATELET # BLD AUTO: 221 K/UL
POTASSIUM SERPL-SCNC: 4.9 MMOL/L
PROT SERPL-MCNC: 6.9 G/DL
PROTEIN URINE: NEGATIVE
RBC # BLD: 5.09 M/UL
RBC # FLD: 13.2 %
RED BLOOD CELLS URINE: 2 /HPF
SARS-COV-2 AB SERPL IA-ACNC: >250 U/ML
SARS-COV-2 AB SERPL QL IA: 0.08 INDEX
SODIUM SERPL-SCNC: 145 MMOL/L
SPECIFIC GRAVITY URINE: 1.01
SQUAMOUS EPITHELIAL CELLS: 5 /HPF
TRIGL SERPL-MCNC: 82 MG/DL
TSH SERPL-ACNC: 2.73 UIU/ML
URATE SERPL-MCNC: 3.8 MG/DL
UROBILINOGEN URINE: NORMAL
VIT B12 SERPL-MCNC: 983 PG/ML
WBC # FLD AUTO: 9.69 K/UL
WHITE BLOOD CELLS URINE: 2 /HPF

## 2022-01-24 ENCOUNTER — RX RENEWAL (OUTPATIENT)
Age: 87
End: 2022-01-24

## 2022-01-27 ENCOUNTER — APPOINTMENT (OUTPATIENT)
Dept: ULTRASOUND IMAGING | Facility: CLINIC | Age: 87
End: 2022-01-27
Payer: MEDICARE

## 2022-01-27 ENCOUNTER — OUTPATIENT (OUTPATIENT)
Dept: OUTPATIENT SERVICES | Facility: HOSPITAL | Age: 87
LOS: 1 days | End: 2022-01-27
Payer: MEDICARE

## 2022-01-27 DIAGNOSIS — K86.2 CYST OF PANCREAS: ICD-10-CM

## 2022-01-27 PROCEDURE — 76700 US EXAM ABDOM COMPLETE: CPT | Mod: 26

## 2022-01-27 PROCEDURE — 76700 US EXAM ABDOM COMPLETE: CPT

## 2022-02-03 ENCOUNTER — APPOINTMENT (OUTPATIENT)
Dept: INTERNAL MEDICINE | Facility: CLINIC | Age: 87
End: 2022-02-03
Payer: MEDICARE

## 2022-02-03 LAB
BILIRUB UR QL STRIP: NORMAL
CLARITY UR: NORMAL
COLLECTION METHOD: NORMAL
GLUCOSE UR-MCNC: NORMAL
HCG UR QL: 0.2 EU/DL
HGB UR QL STRIP.AUTO: NORMAL
KETONES UR-MCNC: NORMAL
LEUKOCYTE ESTERASE UR QL STRIP: NORMAL
NITRITE UR QL STRIP: NORMAL
PH UR STRIP: 7
PROT UR STRIP-MCNC: NORMAL
SP GR UR STRIP: 1.01

## 2022-02-03 PROCEDURE — 99214 OFFICE O/P EST MOD 30 MIN: CPT | Mod: 25

## 2022-02-03 PROCEDURE — 81003 URINALYSIS AUTO W/O SCOPE: CPT | Mod: QW

## 2022-02-03 NOTE — HISTORY OF PRESENT ILLNESS
[FreeTextEntry8] : cc: uti\par \par c/o urinary frequency today with constantly needing to urinate and burning. Just started today.

## 2022-02-04 LAB
APPEARANCE: CLEAR
BACTERIA: NEGATIVE
BILIRUBIN URINE: NEGATIVE
BLOOD URINE: NEGATIVE
COLOR: COLORLESS
GLUCOSE QUALITATIVE U: NEGATIVE
HYALINE CASTS: 0 /LPF
KETONES URINE: NEGATIVE
LEUKOCYTE ESTERASE URINE: NEGATIVE
MICROSCOPIC-UA: NORMAL
NITRITE URINE: NEGATIVE
PH URINE: 6.5
PROTEIN URINE: NEGATIVE
RED BLOOD CELLS URINE: 1 /HPF
SPECIFIC GRAVITY URINE: 1
SQUAMOUS EPITHELIAL CELLS: 1 /HPF
UROBILINOGEN URINE: NORMAL
WHITE BLOOD CELLS URINE: 1 /HPF

## 2022-02-05 LAB — BACTERIA UR CULT: NORMAL

## 2022-02-21 ENCOUNTER — RX RENEWAL (OUTPATIENT)
Age: 87
End: 2022-02-21

## 2022-02-28 ENCOUNTER — APPOINTMENT (OUTPATIENT)
Dept: GASTROENTEROLOGY | Facility: CLINIC | Age: 87
End: 2022-02-28
Payer: MEDICARE

## 2022-02-28 VITALS
WEIGHT: 120 LBS | DIASTOLIC BLOOD PRESSURE: 82 MMHG | HEART RATE: 74 BPM | BODY MASS INDEX: 24.19 KG/M2 | HEIGHT: 59 IN | OXYGEN SATURATION: 92 % | SYSTOLIC BLOOD PRESSURE: 151 MMHG

## 2022-02-28 DIAGNOSIS — N39.0 URINARY TRACT INFECTION, SITE NOT SPECIFIED: ICD-10-CM

## 2022-02-28 PROCEDURE — 99215 OFFICE O/P EST HI 40 MIN: CPT

## 2022-02-28 NOTE — ASSESSMENT
[FreeTextEntry1] : Cindy is a very pleasant 89 year old female presenting for follow up for Crohn's colitis. She has had good tolerance of Stelara and has been in remission. However, now with bouts of fecal urgency, loose stool and food intolerance. her records indicated pancreatic cyst but no history of CRP. She will have two stool tests, fecal elastase and caprotectin. based on results, will decide on need for adjusting crohn's Meds, component for pancreatic insufficiency, versus SIBO. She would benefit from a course of Xifaxin for SIBO and IBS-D. will discuss at her 3 week follow up. \par \par Reviewed and reconciled medications, allergies, PMHx, PSHx, SocHx, FMHx. Reviewed imaging, blood work, diagnostic testing, discussed with patient. Further recommendations pending results of above work up and evaluation.\par

## 2022-02-28 NOTE — CONSULT LETTER
[Dear  ___] : Dear  [unfilled], [Courtesy Letter:] : I had the pleasure of seeing your patient, [unfilled], in my office today. [Consult Closing:] : Thank you very much for allowing me to participate in the care of this patient.  If you have any questions, please do not hesitate to contact me. [Sincerely,] : Sincerely, [FreeTextEntry1] : Cindy is a very pleasant 89 year old female presenting for follow up for Crohn's colitis. She has had good tolerance of Stelara and has been in remission. However, now with bouts of fecal urgency, loose stool and food intolerance. her records indicated pancreatic cyst but no history of CRP. She will have two stool tests, fecal elastase and caprotectin. based on results, will decide on need for adjusting crohn's Meds, component for pancreatic insufficiency, versus SIBO. She would benefit from a course of Xifaxin for SIBO and IBS-D. will discuss at her 3 week follow up. \par \par Reviewed and reconciled medications, allergies, PMHx, PSHx, SocHx, FMHx. Reviewed imaging, blood work, diagnostic testing, discussed with patient. Further recommendations pending results of above work up and evaluation.\par  [FreeTextEntry3] : Nurys Rivera MD\par Gastroenterology, Hepatology and Motility\par

## 2022-02-28 NOTE — PHYSICAL EXAM
[General Appearance - Alert] : alert [General Appearance - In No Acute Distress] : in no acute distress [Outer Ear] : the ears and nose were normal in appearance [Oropharynx] : the oropharynx was normal [Neck Appearance] : the appearance of the neck was normal [Neck Cervical Mass (___cm)] : no neck mass was observed [Jugular Venous Distention Increased] : there was no jugular-venous distention [Thyroid Diffuse Enlargement] : the thyroid was not enlarged [Thyroid Nodule] : there were no palpable thyroid nodules [Auscultation Breath Sounds / Voice Sounds] : lungs were clear to auscultation bilaterally [Heart Rate And Rhythm] : heart rate was normal and rhythm regular [Heart Sounds] : normal S1 and S2 [Heart Sounds Gallop] : no gallops [Murmurs] : no murmurs [Heart Sounds Pericardial Friction Rub] : no pericardial rub [Full Pulse] : the pedal pulses are present [Edema] : there was no peripheral edema [Abdomen Soft] : soft [Bowel Sounds] : normal bowel sounds [Abdomen Mass (___ Cm)] : no abdominal mass palpated [Diffuse] : diffusely [Abnormal Walk] : normal gait [Nail Clubbing] : no clubbing  or cyanosis of the fingernails [Musculoskeletal - Swelling] : no joint swelling seen [Motor Tone] : muscle strength and tone were normal [Skin Color & Pigmentation] : normal skin color and pigmentation [Skin Turgor] : normal skin turgor [] : no rash [Deep Tendon Reflexes (DTR)] : deep tendon reflexes were 2+ and symmetric [Sensation] : the sensory exam was normal to light touch and pinprick [No Focal Deficits] : no focal deficits [Impaired Insight] : insight and judgment were intact [Affect] : the affect was normal

## 2022-02-28 NOTE — HISTORY OF PRESENT ILLNESS
[de-identified] : Cindy is a very pleasant 89 year old female presenting for follow up for Crohn's disease for which she is maintained on Stelara. She previously had severe diarrhea and abdominal pain.She now reports occasional loose stool and fecal urgency which is mild and does not persist. She does report that at times she is unable to clean after BMs, needs to keep wiping.she had a bout of diarrhea recently after eating pancakes and she reports intolerance to greasy foods. also with bloating, pressure and recent UTI symptoms, UA neg, took 2 days of ABx then stopped. \par \par From August 2020 note:\par She has a diagnosis of Crohn's disease, diagnosed about 9 months ago. At that time, she was hospitalized with GI bleed. She is having diarrhea complicated by hematochezia. She underwent endoscopic evaluation and colonoscopy in [place]. She had, per patient and her son, diagnosis of Crohn's disease requiring initiation of stone or after a course of prednisone. She had been doing well. She received her infusion and started the injection therapy in [place].She now reports that for the past several weeks, she has had recurrent lower abdominal cramping pain and loose stool. Intermittent diarrhea and food intolerance. She had some bloating and decreased appetite, mainly feared that her diarrhea will come back As a result, she has been limiting for choices.She is here to establish care now that she will be residing in [place].\par \par

## 2022-03-10 LAB — CALPROTECTIN FECAL: 48 UG/G

## 2022-03-12 LAB — PANCREATIC ELASTASE, FECAL: 93

## 2022-03-28 ENCOUNTER — NON-APPOINTMENT (OUTPATIENT)
Age: 87
End: 2022-03-28

## 2022-03-28 ENCOUNTER — APPOINTMENT (OUTPATIENT)
Dept: CARDIOLOGY | Facility: CLINIC | Age: 87
End: 2022-03-28
Payer: MEDICARE

## 2022-03-28 VITALS
OXYGEN SATURATION: 93 % | SYSTOLIC BLOOD PRESSURE: 144 MMHG | HEIGHT: 59 IN | HEART RATE: 69 BPM | DIASTOLIC BLOOD PRESSURE: 84 MMHG

## 2022-03-28 PROCEDURE — 99215 OFFICE O/P EST HI 40 MIN: CPT

## 2022-03-28 PROCEDURE — 93000 ELECTROCARDIOGRAM COMPLETE: CPT

## 2022-03-28 RX ORDER — USTEKINUMAB 90 MG/ML
90 INJECTION, SOLUTION SUBCUTANEOUS
Qty: 1 | Refills: 5 | Status: DISCONTINUED | COMMUNITY
Start: 2020-09-21 | End: 2022-03-28

## 2022-03-28 RX ORDER — USTEKINUMAB 90 MG/ML
90 INJECTION, SOLUTION SUBCUTANEOUS
Qty: 1 | Refills: 5 | Status: DISCONTINUED | OUTPATIENT
Start: 2020-09-25 | End: 2022-03-28

## 2022-03-28 RX ORDER — USTEKINUMAB 90 MG/ML
90 INJECTION, SOLUTION SUBCUTANEOUS
Qty: 1 | Refills: 5 | Status: DISCONTINUED | COMMUNITY
Start: 2020-08-25 | End: 2022-03-28

## 2022-03-28 RX ORDER — NITROFURANTOIN (MONOHYDRATE/MACROCRYSTALS) 25; 75 MG/1; MG/1
100 CAPSULE ORAL
Qty: 6 | Refills: 0 | Status: DISCONTINUED | COMMUNITY
Start: 2020-10-21 | End: 2022-03-28

## 2022-04-11 ENCOUNTER — APPOINTMENT (OUTPATIENT)
Dept: GASTROENTEROLOGY | Facility: CLINIC | Age: 87
End: 2022-04-11
Payer: MEDICARE

## 2022-04-11 PROCEDURE — 99215 OFFICE O/P EST HI 40 MIN: CPT

## 2022-04-11 NOTE — ASSESSMENT
[FreeTextEntry1] : Cindy is a very pleasant 89 year old female presenting for follow up for diarrhea and Crohn's colitis. \par \par Crohn's Disease: Cindy is in remission. Her inflammatory markers have been negative (fecal calprotectin). She had a complicated course with prolonged hospitalization in [place] prior to moving here and establishing care here in [place]. I would be reluctant to take her off of the Stelara or expose her to another biologic which she may not tolerate, have adverse reaction to, or have flare/ no response to. \par \par Pancreatic insufficiency: Cindy had work up of benign pancreatic lesion in the past , she reports many years ago. She does not have records and reports that it took place in [facility]. Her son is IR in the area and set it up for her. I have requested records which she will also try to obtain through her son. She has been tolerating the Creon and will adjust dose. \par \par Reviewed and reconciled medications, allergies, PMHx, PSHx, SocHx, FMHx. Reviewed imaging, blood work, diagnostic testing, discussed with patient. Further recommendations pending results of above work up and evaluation.\par

## 2022-04-11 NOTE — CONSULT LETTER
[Dear  ___] : Dear  [unfilled], [Courtesy Letter:] : I had the pleasure of seeing your patient, [unfilled], in my office today. [Consult Closing:] : Thank you very much for allowing me to participate in the care of this patient.  If you have any questions, please do not hesitate to contact me. [Sincerely,] : Sincerely, [FreeTextEntry1] : Cindy is a very pleasant 89 year old female presenting for follow up for diarrhea and Crohn's colitis. \par \par Crohn's Disease: Cindy is in remission. Her inflammatory markers have been negative (fecal calprotectin). She had a complicated course with prolonged hospitalization in [place] prior to moving here and establishing care here in [place]. I would be reluctant to take her off of the Stelara or expose her to another biologic which she may not tolerate, have adverse reaction to, or have flare/ no response to. \par \par Pancreatic insufficiency: Cindy had work up of benign pancreatic lesion in the past , she reports many years ago. She does not have records and reports that it took place in [facility]. Her son is IR in the area and set it up for her. I have requested records which she will also try to obtain through her son. She has been tolerating the Creon and will adjust dose. \par \par Reviewed and reconciled medications, allergies, PMHx, PSHx, SocHx, FMHx. Reviewed imaging, blood work, diagnostic testing, discussed with patient. Further recommendations pending results of above work up and evaluation.\par  [FreeTextEntry3] : Nurys Rivera MD\par Gastroenterology, Hepatology and Motility\par

## 2022-04-11 NOTE — HISTORY OF PRESENT ILLNESS
[de-identified] : Cindy is a very pleasant 89 year old female presenting for follow up for Crohn's disease for which she is maintained on Stelara. She previously had severe diarrhea and abdominal pain.She now reports occasional loose stool and fecal urgency which is mild and does not resemble the diarrhea she was having with the Crohn's. Bouts of diarrhea related to foods and she reports intolerance to greasy foods. also with bloating, pressure. Therefore I checked her for pancreatic insufficiency and fecal elastase came back positive (low level) \par \par From August 2020 note:\par She has a diagnosis of Crohn's disease, diagnosed about 9 months ago. At that time, she was hospitalized with GI bleed. She is having diarrhea complicated by hematochezia. She underwent endoscopic evaluation and colonoscopy in [place]. She had, per patient and her son, diagnosis of Crohn's disease requiring initiation of stone or after a course of prednisone. She had been doing well. She received her infusion and started the injection therapy in [place].She now reports that for the past several weeks, she has had recurrent lower abdominal cramping pain and loose stool. Intermittent diarrhea and food intolerance. She had some bloating and decreased appetite, mainly feared that her diarrhea will come back As a result, she has been limiting for choices.She is here to establish care now that she will be residing in [place].\par \par

## 2022-04-11 NOTE — PHYSICAL EXAM
[General Appearance - Alert] : alert [General Appearance - In No Acute Distress] : in no acute distress [Outer Ear] : the ears and nose were normal in appearance [Neck Appearance] : the appearance of the neck was normal [Oropharynx] : the oropharynx was normal [Neck Cervical Mass (___cm)] : no neck mass was observed [Jugular Venous Distention Increased] : there was no jugular-venous distention [Thyroid Diffuse Enlargement] : the thyroid was not enlarged [Thyroid Nodule] : there were no palpable thyroid nodules [Auscultation Breath Sounds / Voice Sounds] : lungs were clear to auscultation bilaterally [Heart Rate And Rhythm] : heart rate was normal and rhythm regular [Heart Sounds] : normal S1 and S2 [Heart Sounds Gallop] : no gallops [Murmurs] : no murmurs [Heart Sounds Pericardial Friction Rub] : no pericardial rub [Full Pulse] : the pedal pulses are present [Edema] : there was no peripheral edema [Bowel Sounds] : normal bowel sounds [Abdomen Soft] : soft [Abdomen Mass (___ Cm)] : no abdominal mass palpated [Diffuse] : diffusely [Abnormal Walk] : normal gait [Nail Clubbing] : no clubbing  or cyanosis of the fingernails [Musculoskeletal - Swelling] : no joint swelling seen [Motor Tone] : muscle strength and tone were normal [Skin Turgor] : normal skin turgor [Skin Color & Pigmentation] : normal skin color and pigmentation [] : no rash [Deep Tendon Reflexes (DTR)] : deep tendon reflexes were 2+ and symmetric [Sensation] : the sensory exam was normal to light touch and pinprick [No Focal Deficits] : no focal deficits [Impaired Insight] : insight and judgment were intact [Affect] : the affect was normal

## 2022-04-13 ENCOUNTER — APPOINTMENT (OUTPATIENT)
Dept: CARDIOLOGY | Facility: CLINIC | Age: 87
End: 2022-04-13
Payer: MEDICARE

## 2022-04-13 PROCEDURE — 93306 TTE W/DOPPLER COMPLETE: CPT

## 2022-04-15 ENCOUNTER — APPOINTMENT (OUTPATIENT)
Dept: MRI IMAGING | Facility: CLINIC | Age: 87
End: 2022-04-15
Payer: MEDICARE

## 2022-04-15 ENCOUNTER — OUTPATIENT (OUTPATIENT)
Dept: OUTPATIENT SERVICES | Facility: HOSPITAL | Age: 87
LOS: 1 days | End: 2022-04-15
Payer: MEDICARE

## 2022-04-15 DIAGNOSIS — D13.6 BENIGN NEOPLASM OF PANCREAS: ICD-10-CM

## 2022-04-15 DIAGNOSIS — K86.2 CYST OF PANCREAS: ICD-10-CM

## 2022-04-15 DIAGNOSIS — K83.8 OTHER SPECIFIED DISEASES OF BILIARY TRACT: ICD-10-CM

## 2022-04-15 DIAGNOSIS — K86.89 OTHER SPECIFIED DISEASES OF PANCREAS: ICD-10-CM

## 2022-04-15 PROCEDURE — 74183 MRI ABD W/O CNTR FLWD CNTR: CPT | Mod: 26,ME

## 2022-04-15 PROCEDURE — A9585: CPT

## 2022-04-15 PROCEDURE — 74183 MRI ABD W/O CNTR FLWD CNTR: CPT | Mod: ME

## 2022-04-15 PROCEDURE — G1004: CPT

## 2022-04-20 ENCOUNTER — NON-APPOINTMENT (OUTPATIENT)
Age: 87
End: 2022-04-20

## 2022-04-20 ENCOUNTER — RX RENEWAL (OUTPATIENT)
Age: 87
End: 2022-04-20

## 2022-04-22 ENCOUNTER — RX RENEWAL (OUTPATIENT)
Age: 87
End: 2022-04-22

## 2022-05-19 ENCOUNTER — RX RENEWAL (OUTPATIENT)
Age: 87
End: 2022-05-19

## 2022-06-13 ENCOUNTER — APPOINTMENT (OUTPATIENT)
Dept: GASTROENTEROLOGY | Facility: CLINIC | Age: 87
End: 2022-06-13
Payer: MEDICARE

## 2022-06-13 VITALS
HEART RATE: 67 BPM | DIASTOLIC BLOOD PRESSURE: 81 MMHG | HEIGHT: 59 IN | OXYGEN SATURATION: 99 % | BODY MASS INDEX: 24.39 KG/M2 | WEIGHT: 121 LBS | SYSTOLIC BLOOD PRESSURE: 144 MMHG

## 2022-06-13 PROCEDURE — 99215 OFFICE O/P EST HI 40 MIN: CPT

## 2022-06-13 NOTE — HISTORY OF PRESENT ILLNESS
[de-identified] : Cindy is a very pleasant 89 year old female presenting for follow up for Crohn's disease for which she is maintained on Stelara. She also has severe pancreatic insufficiency for which she is being treated with Creon. reports bout of diarrhea after eating pasta. she has not been taking the Creon prior to snacks and has been limiting her diet. \par \par prior note 4/11: She previously had severe diarrhea and abdominal pain.She now reports occasional loose stool and fecal urgency which is mild and does not resemble the diarrhea she was having with the Crohn's. Bouts of diarrhea related to foods and she reports intolerance to greasy foods. also with bloating, pressure. Therefore I checked her for pancreatic insufficiency and fecal elastase came back positive (low level) \par \par From August 2020 note:\par She has a diagnosis of Crohn's disease, diagnosed about 9 months ago. At that time, she was hospitalized with GI bleed. She is having diarrhea complicated by hematochezia. She underwent endoscopic evaluation and colonoscopy in [place]. She had, per patient and her son, diagnosis of Crohn's disease requiring initiation of stone or after a course of prednisone. She had been doing well. She received her infusion and started the injection therapy in [place].She now reports that for the past several weeks, she has had recurrent lower abdominal cramping pain and loose stool. Intermittent diarrhea and food intolerance. She had some bloating and decreased appetite, mainly feared that her diarrhea will come back As a result, she has been limiting for choices.She is here to establish care now that she will be residing in [place].\par \par

## 2022-06-13 NOTE — ASSESSMENT
[FreeTextEntry1] : Cindy is a very pleasant 89 year old female presenting for follow up for diarrhea and Crohn's colitis. she has pancreatic insufficiency from [facility]. I do not have prior records, diagnosed out of state 15 year [facility] then moved to [place]. \par \par Crohn's Disease: Cindy is in remission. Her inflammatory markers have been negative (fecal calprotectin). She had a complicated course with prolonged hospitalization in [place] prior to moving here and establishing care here in [place]. I would be reluctant to take her off of the Stelara or expose her to another biologic which she may not tolerate, have adverse reaction to, or have flare/ no response to. \par \par Pancreatic insufficiency: Cindy had work up of benign pancreatic lesion in the past , she reports many years ago. She does not have records and reports that it took place in [facility]. Her son is IR in the area and set it up for her. I have requested records which she will also try to obtain through her son. She has been tolerating the Creon and will adjust dose. She has severely limited her diet and she is advised to take the enzymes and liberate her diet. She will follow up in 3 months. \par \par Reviewed and reconciled medications, allergies, PMHx, PSHx, SocHx, FMHx. Reviewed imaging, blood work, diagnostic testing, discussed with patient. Further recommendations pending results of above work up and evaluation.\par

## 2022-06-13 NOTE — CONSULT LETTER
[Dear  ___] : Dear  [unfilled], [Courtesy Letter:] : I had the pleasure of seeing your patient, [unfilled], in my office today. [Consult Closing:] : Thank you very much for allowing me to participate in the care of this patient.  If you have any questions, please do not hesitate to contact me. [Sincerely,] : Sincerely, [FreeTextEntry1] : Cindy is a very pleasant 89 year old female presenting for follow up for diarrhea and Crohn's colitis. she has pancreatic insufficiency from [facility]. I do not have prior records, diagnosed out of state 15 year [facility] then moved to [place]. \par \par Crohn's Disease: Cindy is in remission. Her inflammatory markers have been negative (fecal calprotectin). She had a complicated course with prolonged hospitalization in [place] prior to moving here and establishing care here in [place]. I would be reluctant to take her off of the Stelara or expose her to another biologic which she may not tolerate, have adverse reaction to, or have flare/ no response to. \par \par Pancreatic insufficiency: Cindy had work up of benign pancreatic lesion in the past , she reports many years ago. She does not have records and reports that it took place in [facility]. Her son is IR in the area and set it up for her. I have requested records which she will also try to obtain through her son. She has been tolerating the Creon and will adjust dose. She has severely limited her diet and she is advised to take the enzymes and liberate her diet. She will follow up in 3 months. \par \par Reviewed and reconciled medications, allergies, PMHx, PSHx, SocHx, FMHx. Reviewed imaging, blood work, diagnostic testing, discussed with patient. Further recommendations pending results of above work up and evaluation.\par  [FreeTextEntry3] : Nurys Rivera MD\par Gastroenterology, Hepatology and Motility\par

## 2022-06-13 NOTE — REASON FOR VISIT
[Follow-Up: _____] : a [unfilled] follow-up visit [FreeTextEntry1] : diarrhea, fecal urgency, Crohn's colitis, pancreatic insufficiency

## 2022-08-03 ENCOUNTER — APPOINTMENT (OUTPATIENT)
Dept: INTERNAL MEDICINE | Facility: CLINIC | Age: 87
End: 2022-08-03

## 2022-08-03 VITALS
DIASTOLIC BLOOD PRESSURE: 76 MMHG | TEMPERATURE: 97.6 F | RESPIRATION RATE: 14 BRPM | HEART RATE: 71 BPM | HEIGHT: 59 IN | OXYGEN SATURATION: 96 % | WEIGHT: 122 LBS | BODY MASS INDEX: 24.6 KG/M2 | SYSTOLIC BLOOD PRESSURE: 136 MMHG

## 2022-08-03 DIAGNOSIS — E66.9 OBESITY, UNSPECIFIED: Chronic | ICD-10-CM

## 2022-08-03 PROCEDURE — 99214 OFFICE O/P EST MOD 30 MIN: CPT

## 2022-08-03 NOTE — PHYSICAL EXAM
[Normal] : affect was normal and insight and judgment were intact [de-identified] : ankle edema [de-identified] : right forearm with ecchymoses and small superficial hematoma

## 2022-08-03 NOTE — HISTORY OF PRESENT ILLNESS
[de-identified] : c/o urinary frequency going on for a while. \par c/o fragile, thin skin. It peels and bruises easily. \par

## 2022-08-04 LAB
ALBUMIN SERPL ELPH-MCNC: 4.4 G/DL
ALP BLD-CCNC: 105 U/L
ALT SERPL-CCNC: 21 U/L
ANION GAP SERPL CALC-SCNC: 10 MMOL/L
APPEARANCE: CLEAR
AST SERPL-CCNC: 29 U/L
BACTERIA: NEGATIVE
BASOPHILS # BLD AUTO: 0.03 K/UL
BASOPHILS NFR BLD AUTO: 0.3 %
BILIRUB SERPL-MCNC: 0.5 MG/DL
BILIRUBIN URINE: NEGATIVE
BLOOD URINE: NEGATIVE
BUN SERPL-MCNC: 25 MG/DL
CALCIUM SERPL-MCNC: 10.2 MG/DL
CHLORIDE SERPL-SCNC: 107 MMOL/L
CHOLEST SERPL-MCNC: 198 MG/DL
CO2 SERPL-SCNC: 27 MMOL/L
COLOR: COLORLESS
CREAT SERPL-MCNC: 0.78 MG/DL
EGFR: 73 ML/MIN/1.73M2
EOSINOPHIL # BLD AUTO: 0.2 K/UL
EOSINOPHIL NFR BLD AUTO: 2.3 %
ESTIMATED AVERAGE GLUCOSE: 114 MG/DL
FOLATE SERPL-MCNC: >20 NG/ML
GLUCOSE QUALITATIVE U: NEGATIVE
GLUCOSE SERPL-MCNC: 85 MG/DL
HBA1C MFR BLD HPLC: 5.6 %
HCT VFR BLD CALC: 47.1 %
HDLC SERPL-MCNC: 77 MG/DL
HGB BLD-MCNC: 15.3 G/DL
HYALINE CASTS: 0 /LPF
IMM GRANULOCYTES NFR BLD AUTO: 0.2 %
KETONES URINE: NEGATIVE
LDLC SERPL CALC-MCNC: 101 MG/DL
LEUKOCYTE ESTERASE URINE: NEGATIVE
LYMPHOCYTES # BLD AUTO: 2.87 K/UL
LYMPHOCYTES NFR BLD AUTO: 32.8 %
MAN DIFF?: NORMAL
MCHC RBC-ENTMCNC: 30.1 PG
MCHC RBC-ENTMCNC: 32.5 GM/DL
MCV RBC AUTO: 92.7 FL
MICROSCOPIC-UA: NORMAL
MONOCYTES # BLD AUTO: 0.55 K/UL
MONOCYTES NFR BLD AUTO: 6.3 %
NEUTROPHILS # BLD AUTO: 5.08 K/UL
NEUTROPHILS NFR BLD AUTO: 58.1 %
NITRITE URINE: NEGATIVE
NONHDLC SERPL-MCNC: 122 MG/DL
PH URINE: 6.5
PLATELET # BLD AUTO: 240 K/UL
POTASSIUM SERPL-SCNC: 5.2 MMOL/L
PROT SERPL-MCNC: 6.9 G/DL
PROTEIN URINE: NEGATIVE
RBC # BLD: 5.08 M/UL
RBC # FLD: 13.3 %
RED BLOOD CELLS URINE: 0 /HPF
SODIUM SERPL-SCNC: 144 MMOL/L
SPECIFIC GRAVITY URINE: 1
SQUAMOUS EPITHELIAL CELLS: 0 /HPF
TRIGL SERPL-MCNC: 102 MG/DL
TSH SERPL-ACNC: 1.59 UIU/ML
UROBILINOGEN URINE: NORMAL
VIT B12 SERPL-MCNC: 939 PG/ML
WBC # FLD AUTO: 8.75 K/UL
WHITE BLOOD CELLS URINE: 0 /HPF

## 2022-08-14 ENCOUNTER — RX RENEWAL (OUTPATIENT)
Age: 87
End: 2022-08-14

## 2022-08-29 ENCOUNTER — RX RENEWAL (OUTPATIENT)
Age: 87
End: 2022-08-29

## 2022-09-12 ENCOUNTER — APPOINTMENT (OUTPATIENT)
Dept: GASTROENTEROLOGY | Facility: CLINIC | Age: 87
End: 2022-09-12

## 2022-09-12 VITALS
HEART RATE: 69 BPM | BODY MASS INDEX: 24.6 KG/M2 | WEIGHT: 122 LBS | OXYGEN SATURATION: 96 % | DIASTOLIC BLOOD PRESSURE: 78 MMHG | SYSTOLIC BLOOD PRESSURE: 147 MMHG | HEIGHT: 59 IN

## 2022-09-12 PROCEDURE — 99215 OFFICE O/P EST HI 40 MIN: CPT

## 2022-09-12 NOTE — HISTORY OF PRESENT ILLNESS
[de-identified] : Cindy is a very pleasant 89 year old female presenting for follow up for Crohn's disease for which she is maintained on Stelara. She also has severe pancreatic insufficiency for which she is being treated with Creon. reports bout of diarrhea after eating pasta. she has not been taking the Creon prior to snacks and has been limiting her diet. recent bout of fecal urgency and large loose BM . about 2 weeks ago after a wekend away. \par \par prior note 4/11: She previously had severe diarrhea and abdominal pain.She now reports occasional loose stool and fecal urgency which is mild and does not resemble the diarrhea she was having with the Crohn's. Bouts of diarrhea related to foods and she reports intolerance to greasy foods. also with bloating, pressure. Therefore I checked her for pancreatic insufficiency and fecal elastase came back positive (low level) \par \par From August 2020 note:\par She has a diagnosis of Crohn's disease, diagnosed about 9 months ago. At that time, she was hospitalized with GI bleed. She is having diarrhea complicated by hematochezia. She underwent endoscopic evaluation and colonoscopy in [place]. She had, per patient and her son, diagnosis of Crohn's disease requiring initiation of stone or after a course of prednisone. She had been doing well. She received her infusion and started the injection therapy in [place].She now reports that for the past several weeks, she has had recurrent lower abdominal cramping pain and loose stool. Intermittent diarrhea and food intolerance. She had some bloating and decreased appetite, mainly feared that her diarrhea will come back As a result, she has been limiting for choices.She is here to establish care now that she will be residing in [place].\par \par

## 2022-09-12 NOTE — CONSULT LETTER
[Dear  ___] : Dear  [unfilled], [Courtesy Letter:] : I had the pleasure of seeing your patient, [unfilled], in my office today. [Consult Closing:] : Thank you very much for allowing me to participate in the care of this patient.  If you have any questions, please do not hesitate to contact me. [Sincerely,] : Sincerely, [FreeTextEntry1] : Cindy is a very pleasant 89 year old female presenting for follow up for diarrhea and Crohn's colitis. she has pancreatic insufficiency from [facility]. \par \par Crohn's Disease: Cindy is in remission. Her inflammatory markers have been negative (fecal calprotectin). She had a complicated course with prolonged hospitalization in [place] prior to moving here and establishing care here in [place]. I would be reluctant to take her off of the Stelara or expose her to another biologic which she may not tolerate, have adverse reaction to, or have flare/ no response to. I discussed with her again in detail and she favors a dose decrease. She will get the 90 mcg once every 12 weeks. \par \par Pancreatic insufficiency: Cindy had work up of benign pancreatic lesion in the past , she reports many years ago. She does not have records and reports that it took place in [facility]. Her son is IR in the area and set it up for her. I have requested records which she will also try to obtain through her son. She has been tolerating the Creon and will adjust dose. She has severely limited her diet and she is advised to take the enzymes and liberate her diet. She will follow up in 2-3 months. \par \par Reviewed and reconciled medications, allergies, PMHx, PSHx, SocHx, FMHx. Reviewed imaging, blood work, diagnostic testing, discussed with patient. Further recommendations pending results of above work up and evaluation.\par  [FreeTextEntry3] : Nurys Rivera MD\par Gastroenterology, Hepatology and Motility\par

## 2022-09-12 NOTE — ASSESSMENT
[FreeTextEntry1] : Cindy is a very pleasant 89 year old female presenting for follow up for diarrhea and Crohn's colitis. she has pancreatic insufficiency from [facility]. \par \par Crohn's Disease: Cindy is in remission. Her inflammatory markers have been negative (fecal calprotectin). She had a complicated course with prolonged hospitalization in [place] prior to moving here and establishing care here in [place]. I would be reluctant to take her off of the Stelara or expose her to another biologic which she may not tolerate, have adverse reaction to, or have flare/ no response to. I discussed with her again in detail and she favors a dose decrease. She will get the 90 mcg once every 12 weeks. \par \par Pancreatic insufficiency: Cindy had work up of benign pancreatic lesion in the past , she reports many years ago. She does not have records and reports that it took place in [facility]. Her son is IR in the area and set it up for her. I have requested records which she will also try to obtain through her son. She has been tolerating the Creon and will adjust dose. She has severely limited her diet and she is advised to take the enzymes and liberate her diet. She will follow up in 2-3 months. \par \par Reviewed and reconciled medications, allergies, PMHx, PSHx, SocHx, FMHx. Reviewed imaging, blood work, diagnostic testing, discussed with patient. Further recommendations pending results of above work up and evaluation.\par

## 2022-09-28 ENCOUNTER — NON-APPOINTMENT (OUTPATIENT)
Age: 87
End: 2022-09-28

## 2022-09-28 ENCOUNTER — APPOINTMENT (OUTPATIENT)
Dept: CARDIOLOGY | Facility: CLINIC | Age: 87
End: 2022-09-28

## 2022-09-28 VITALS
BODY MASS INDEX: 25 KG/M2 | HEART RATE: 72 BPM | OXYGEN SATURATION: 98 % | DIASTOLIC BLOOD PRESSURE: 76 MMHG | WEIGHT: 124 LBS | HEIGHT: 59 IN | SYSTOLIC BLOOD PRESSURE: 151 MMHG

## 2022-09-28 PROCEDURE — 99215 OFFICE O/P EST HI 40 MIN: CPT

## 2022-09-28 PROCEDURE — 93000 ELECTROCARDIOGRAM COMPLETE: CPT

## 2022-10-14 ENCOUNTER — APPOINTMENT (OUTPATIENT)
Dept: INTERNAL MEDICINE | Facility: CLINIC | Age: 87
End: 2022-10-14

## 2022-10-14 PROCEDURE — 99442: CPT | Mod: 95

## 2022-10-14 RX ORDER — AZITHROMYCIN 250 MG/1
250 TABLET, FILM COATED ORAL
Qty: 6 | Refills: 0 | Status: DISCONTINUED | COMMUNITY
Start: 2022-06-20 | End: 2022-10-14

## 2022-10-14 NOTE — REVIEW OF SYSTEMS
[Fatigue] : fatigue [Nasal Discharge] : nasal discharge [Sore Throat] : sore throat [Shortness Of Breath] : no shortness of breath [Cough] : no cough [Negative] : Heme/Lymph

## 2022-10-14 NOTE — HISTORY OF PRESENT ILLNESS
[Home] : at home, [unfilled] , at the time of the visit. [Medical Office: ([facility])___] : at the medical office located in  [Verbal consent obtained from patient] : the patient, [unfilled] [FreeTextEntry8] : cc: covid\par \par Pt states that she had a s/t for 4 days last week and covid negative. Felt better for one day and then 2 days ago started to have s/t again\par so retested and positive. She also has congestion and fatigue. Not coughing. Her main concern is that her BP is running high ~ 168/91 and 174/92 highest.

## 2022-10-19 ENCOUNTER — RX RENEWAL (OUTPATIENT)
Age: 87
End: 2022-10-19

## 2022-11-02 ENCOUNTER — APPOINTMENT (OUTPATIENT)
Dept: INTERNAL MEDICINE | Facility: CLINIC | Age: 87
End: 2022-11-02

## 2022-11-02 VITALS
HEART RATE: 76 BPM | SYSTOLIC BLOOD PRESSURE: 124 MMHG | WEIGHT: 124 LBS | RESPIRATION RATE: 14 BRPM | DIASTOLIC BLOOD PRESSURE: 70 MMHG | HEIGHT: 59 IN | OXYGEN SATURATION: 94 % | BODY MASS INDEX: 25 KG/M2

## 2022-11-02 DIAGNOSIS — R35.0 FREQUENCY OF MICTURITION: ICD-10-CM

## 2022-11-02 PROCEDURE — 99214 OFFICE O/P EST MOD 30 MIN: CPT

## 2022-11-02 RX ORDER — USTEKINUMAB 45 MG/.5ML
45 INJECTION, SOLUTION SUBCUTANEOUS
Qty: 1 | Refills: 0 | Status: DISCONTINUED | COMMUNITY
Start: 2022-06-29 | End: 2022-11-02

## 2022-11-02 RX ORDER — PANCRELIPASE 60000; 12000; 38000 [USP'U]/1; [USP'U]/1; [USP'U]/1
12000-38000 CAPSULE, DELAYED RELEASE PELLETS ORAL
Qty: 250 | Refills: 0 | Status: DISCONTINUED | COMMUNITY
Start: 2022-05-15 | End: 2022-11-02

## 2022-11-02 NOTE — PHYSICAL EXAM
[No Edema] : there was no peripheral edema [Normal] : affect was normal and insight and judgment were intact [de-identified] : left groin mass reducible

## 2022-11-02 NOTE — HISTORY OF PRESENT ILLNESS
[FreeTextEntry1] : BP check [de-identified] : Pt had covid 3 weeks ago with bad s/t and exhaustion. \par \par Has been urinating a lot sometimes. 2 nights ago had it a lot and last night it was normal. \par \par Has pancreatic insufficiency and Crohn's. \par Her creon and stelara doses were adjusted.

## 2022-11-03 LAB
25(OH)D3 SERPL-MCNC: 53.6 NG/ML
ALBUMIN SERPL ELPH-MCNC: 4.1 G/DL
ALP BLD-CCNC: 105 U/L
ALT SERPL-CCNC: 18 U/L
ANION GAP SERPL CALC-SCNC: 11 MMOL/L
APPEARANCE: CLEAR
AST SERPL-CCNC: 27 U/L
BACTERIA UR CULT: NORMAL
BACTERIA: NEGATIVE
BASOPHILS # BLD AUTO: 0.05 K/UL
BASOPHILS NFR BLD AUTO: 0.5 %
BILIRUB SERPL-MCNC: 0.6 MG/DL
BILIRUBIN URINE: NEGATIVE
BLOOD URINE: NEGATIVE
BUN SERPL-MCNC: 20 MG/DL
CALCIUM SERPL-MCNC: 9.8 MG/DL
CHLORIDE SERPL-SCNC: 106 MMOL/L
CHOLEST SERPL-MCNC: 196 MG/DL
CO2 SERPL-SCNC: 27 MMOL/L
COLOR: COLORLESS
CREAT SERPL-MCNC: 0.74 MG/DL
EGFR: 77 ML/MIN/1.73M2
EOSINOPHIL # BLD AUTO: 0.2 K/UL
EOSINOPHIL NFR BLD AUTO: 2.1 %
ESTIMATED AVERAGE GLUCOSE: 117 MG/DL
FOLATE SERPL-MCNC: >20 NG/ML
GLUCOSE QUALITATIVE U: NEGATIVE
GLUCOSE SERPL-MCNC: 87 MG/DL
HBA1C MFR BLD HPLC: 5.7 %
HCT VFR BLD CALC: 48.6 %
HDLC SERPL-MCNC: 76 MG/DL
HGB BLD-MCNC: 15.7 G/DL
HYALINE CASTS: 0 /LPF
IMM GRANULOCYTES NFR BLD AUTO: 0.1 %
KETONES URINE: NEGATIVE
LDLC SERPL CALC-MCNC: 104 MG/DL
LEUKOCYTE ESTERASE URINE: NEGATIVE
LYMPHOCYTES # BLD AUTO: 2.6 K/UL
LYMPHOCYTES NFR BLD AUTO: 27.4 %
MAN DIFF?: NORMAL
MCHC RBC-ENTMCNC: 30.7 PG
MCHC RBC-ENTMCNC: 32.3 GM/DL
MCV RBC AUTO: 95.1 FL
MICROSCOPIC-UA: NORMAL
MONOCYTES # BLD AUTO: 0.63 K/UL
MONOCYTES NFR BLD AUTO: 6.6 %
NEUTROPHILS # BLD AUTO: 6.01 K/UL
NEUTROPHILS NFR BLD AUTO: 63.3 %
NITRITE URINE: NEGATIVE
NONHDLC SERPL-MCNC: 121 MG/DL
PH URINE: 6.5
PLATELET # BLD AUTO: 245 K/UL
POTASSIUM SERPL-SCNC: 4.6 MMOL/L
PROT SERPL-MCNC: 7.1 G/DL
PROTEIN URINE: NEGATIVE
RBC # BLD: 5.11 M/UL
RBC # FLD: 13.3 %
RED BLOOD CELLS URINE: 2 /HPF
SODIUM SERPL-SCNC: 144 MMOL/L
SPECIFIC GRAVITY URINE: 1.01
SQUAMOUS EPITHELIAL CELLS: 1 /HPF
TRIGL SERPL-MCNC: 83 MG/DL
TSH SERPL-ACNC: 1.68 UIU/ML
URATE SERPL-MCNC: 4 MG/DL
UROBILINOGEN URINE: NORMAL
VIT B12 SERPL-MCNC: 1059 PG/ML
WBC # FLD AUTO: 9.5 K/UL
WHITE BLOOD CELLS URINE: 1 /HPF

## 2022-11-08 ENCOUNTER — NON-APPOINTMENT (OUTPATIENT)
Age: 87
End: 2022-11-08

## 2022-11-11 ENCOUNTER — NON-APPOINTMENT (OUTPATIENT)
Age: 87
End: 2022-11-11

## 2022-11-12 ENCOUNTER — NON-APPOINTMENT (OUTPATIENT)
Age: 87
End: 2022-11-12

## 2022-11-14 LAB
M TB IFN-G BLD-IMP: NEGATIVE
QUANTIFERON TB PLUS MITOGEN MINUS NIL: 4.68 IU/ML
QUANTIFERON TB PLUS NIL: 0.05 IU/ML
QUANTIFERON TB PLUS TB1 MINUS NIL: 0 IU/ML
QUANTIFERON TB PLUS TB2 MINUS NIL: -0.02 IU/ML

## 2022-11-20 ENCOUNTER — RX RENEWAL (OUTPATIENT)
Age: 87
End: 2022-11-20

## 2022-12-05 ENCOUNTER — APPOINTMENT (OUTPATIENT)
Dept: GASTROENTEROLOGY | Facility: CLINIC | Age: 87
End: 2022-12-05

## 2022-12-05 VITALS
SYSTOLIC BLOOD PRESSURE: 160 MMHG | DIASTOLIC BLOOD PRESSURE: 84 MMHG | WEIGHT: 122 LBS | OXYGEN SATURATION: 92 % | HEART RATE: 69 BPM | BODY MASS INDEX: 24.6 KG/M2 | HEIGHT: 59 IN

## 2022-12-05 VITALS — OXYGEN SATURATION: 96 %

## 2022-12-05 DIAGNOSIS — U07.1 COVID-19: ICD-10-CM

## 2022-12-05 DIAGNOSIS — B02.9 ZOSTER W/OUT COMPLICATIONS: ICD-10-CM

## 2022-12-05 PROCEDURE — 99214 OFFICE O/P EST MOD 30 MIN: CPT | Mod: CS

## 2022-12-23 ENCOUNTER — EMERGENCY (EMERGENCY)
Facility: HOSPITAL | Age: 87
LOS: 1 days | Discharge: HOME | End: 2022-12-23
Attending: EMERGENCY MEDICINE | Admitting: EMERGENCY MEDICINE
Payer: MEDICARE

## 2022-12-23 VITALS
SYSTOLIC BLOOD PRESSURE: 146 MMHG | WEIGHT: 119.93 LBS | HEART RATE: 84 BPM | TEMPERATURE: 98 F | DIASTOLIC BLOOD PRESSURE: 82 MMHG | HEIGHT: 59 IN | RESPIRATION RATE: 16 BRPM | OXYGEN SATURATION: 100 %

## 2022-12-23 LAB
ALBUMIN SERPL ELPH-MCNC: 3.5 G/DL — SIGNIFICANT CHANGE UP (ref 3.3–5)
ALP SERPL-CCNC: 112 U/L — SIGNIFICANT CHANGE UP (ref 30–120)
ALT FLD-CCNC: 18 U/L DA — SIGNIFICANT CHANGE UP (ref 10–60)
ANION GAP SERPL CALC-SCNC: 8 MMOL/L — SIGNIFICANT CHANGE UP (ref 5–17)
AST SERPL-CCNC: 26 U/L — SIGNIFICANT CHANGE UP (ref 10–40)
BASOPHILS # BLD AUTO: 0.02 K/UL — SIGNIFICANT CHANGE UP (ref 0–0.2)
BASOPHILS NFR BLD AUTO: 0.2 % — SIGNIFICANT CHANGE UP (ref 0–2)
BILIRUB SERPL-MCNC: 0.7 MG/DL — SIGNIFICANT CHANGE UP (ref 0.2–1.2)
BUN SERPL-MCNC: 17 MG/DL — SIGNIFICANT CHANGE UP (ref 7–23)
CALCIUM SERPL-MCNC: 8.9 MG/DL — SIGNIFICANT CHANGE UP (ref 8.4–10.5)
CHLORIDE SERPL-SCNC: 104 MMOL/L — SIGNIFICANT CHANGE UP (ref 96–108)
CO2 SERPL-SCNC: 26 MMOL/L — SIGNIFICANT CHANGE UP (ref 22–31)
CREAT SERPL-MCNC: 0.77 MG/DL — SIGNIFICANT CHANGE UP (ref 0.5–1.3)
EGFR: 73 ML/MIN/1.73M2 — SIGNIFICANT CHANGE UP
EOSINOPHIL # BLD AUTO: 0.27 K/UL — SIGNIFICANT CHANGE UP (ref 0–0.5)
EOSINOPHIL NFR BLD AUTO: 3.1 % — SIGNIFICANT CHANGE UP (ref 0–6)
GLUCOSE SERPL-MCNC: 114 MG/DL — HIGH (ref 70–99)
HCT VFR BLD CALC: 44.6 % — SIGNIFICANT CHANGE UP (ref 34.5–45)
HGB BLD-MCNC: 14.3 G/DL — SIGNIFICANT CHANGE UP (ref 11.5–15.5)
IMM GRANULOCYTES NFR BLD AUTO: 0.1 % — SIGNIFICANT CHANGE UP (ref 0–0.9)
LIDOCAIN IGE QN: 45 U/L — LOW (ref 73–393)
LYMPHOCYTES # BLD AUTO: 2.51 K/UL — SIGNIFICANT CHANGE UP (ref 1–3.3)
LYMPHOCYTES # BLD AUTO: 28.5 % — SIGNIFICANT CHANGE UP (ref 13–44)
MCHC RBC-ENTMCNC: 29.9 PG — SIGNIFICANT CHANGE UP (ref 27–34)
MCHC RBC-ENTMCNC: 32.1 GM/DL — SIGNIFICANT CHANGE UP (ref 32–36)
MCV RBC AUTO: 93.3 FL — SIGNIFICANT CHANGE UP (ref 80–100)
MONOCYTES # BLD AUTO: 0.84 K/UL — SIGNIFICANT CHANGE UP (ref 0–0.9)
MONOCYTES NFR BLD AUTO: 9.5 % — SIGNIFICANT CHANGE UP (ref 2–14)
NEUTROPHILS # BLD AUTO: 5.15 K/UL — SIGNIFICANT CHANGE UP (ref 1.8–7.4)
NEUTROPHILS NFR BLD AUTO: 58.6 % — SIGNIFICANT CHANGE UP (ref 43–77)
NRBC # BLD: 0 /100 WBCS — SIGNIFICANT CHANGE UP (ref 0–0)
PLATELET # BLD AUTO: 231 K/UL — SIGNIFICANT CHANGE UP (ref 150–400)
POTASSIUM SERPL-MCNC: 3.5 MMOL/L — SIGNIFICANT CHANGE UP (ref 3.5–5.3)
POTASSIUM SERPL-SCNC: 3.5 MMOL/L — SIGNIFICANT CHANGE UP (ref 3.5–5.3)
PROT SERPL-MCNC: 7 G/DL — SIGNIFICANT CHANGE UP (ref 6–8.3)
RBC # BLD: 4.78 M/UL — SIGNIFICANT CHANGE UP (ref 3.8–5.2)
RBC # FLD: 13.9 % — SIGNIFICANT CHANGE UP (ref 10.3–14.5)
SODIUM SERPL-SCNC: 138 MMOL/L — SIGNIFICANT CHANGE UP (ref 135–145)
WBC # BLD: 8.8 K/UL — SIGNIFICANT CHANGE UP (ref 3.8–10.5)
WBC # FLD AUTO: 8.8 K/UL — SIGNIFICANT CHANGE UP (ref 3.8–10.5)

## 2022-12-23 PROCEDURE — 36415 COLL VENOUS BLD VENIPUNCTURE: CPT

## 2022-12-23 PROCEDURE — 80053 COMPREHEN METABOLIC PANEL: CPT

## 2022-12-23 PROCEDURE — 99284 EMERGENCY DEPT VISIT MOD MDM: CPT

## 2022-12-23 PROCEDURE — 99283 EMERGENCY DEPT VISIT LOW MDM: CPT

## 2022-12-23 PROCEDURE — 83690 ASSAY OF LIPASE: CPT

## 2022-12-23 PROCEDURE — 85025 COMPLETE CBC W/AUTO DIFF WBC: CPT

## 2022-12-23 RX ORDER — SODIUM CHLORIDE 9 MG/ML
1000 INJECTION INTRAMUSCULAR; INTRAVENOUS; SUBCUTANEOUS ONCE
Refills: 0 | Status: DISCONTINUED | OUTPATIENT
Start: 2022-12-23 | End: 2022-12-26

## 2022-12-23 NOTE — ED PROVIDER NOTE - CLINICAL SUMMARY MEDICAL DECISION MAKING FREE TEXT BOX
Patient with diarrhea for 2 days with severe episode this morning. Feels well now. Will check labs and hyfrate. If no emergent findings, can f/u with her doctor as an out-patient

## 2022-12-23 NOTE — ED ADULT NURSE REASSESSMENT NOTE - NS ED NURSE REASSESS COMMENT FT1
no acute respiratory distress, ambulating with steady gait, no c/o pain, voiding without difficulty, no bm, daughter at bedside.

## 2022-12-23 NOTE — ED PROVIDER NOTE - CARE PROVIDER_API CALL
Veda Cartagena)  Internal Medicine  [address] [place] [address]  Phone: [phone]  Fax: [phone]  Follow Up Time:

## 2022-12-23 NOTE — ED PROVIDER NOTE - OBJECTIVE STATEMENT
Patient with history of Crohn's disease and pancreatic insufficiency states she developed some mild diarrhea 2 days ago.  Approximately 2 hours ago patient states she had profuse diarrhea and was unable to get off the toilet for about an hour.  Patient became concerned and called EMS.  Now states she feels fine.  No abdominal pain.  No nausea vomiting.  No fever.  Patient admits that she is concerned that the diarrhea was a presentation of pancreatic cancer as she recently lost a friend to that diagnosis.

## 2022-12-23 NOTE — ED ADULT NURSE NOTE - OBJECTIVE STATEMENT
c/o abdominal pain for past 2 days with diarrhea, reports explosive diarrhea PTA, no c/o pain currently, patient states she has history of chrons disease and pancreatic insufficiency, patient states " I am afraid I have cancer", patient denies recent use of antibiotics, denies fever

## 2022-12-23 NOTE — ED PROVIDER NOTE - PATIENT PORTAL LINK FT
You can access the FollowMyHealth Patient Portal offered by [facility] by registering at the following website: [email or web address]. By joining [employer]’s FollowMyHealth portal, you will also be able to view your health information using other applications (apps) compatible with our system.

## 2022-12-23 NOTE — ED ADULT NURSE NOTE - NSIMPLEMENTINTERV_GEN_ALL_ED
Implemented All Fall with Harm Risk Interventions:  [place] to call system. Call bell, personal items and telephone within reach. Instruct patient to call for assistance. Room bathroom lighting operational. Non-slip footwear when patient is off stretcher. Physically safe environment: no spills, clutter or unnecessary equipment. Stretcher in lowest position, wheels locked, appropriate side rails in place. Provide visual cue, wrist band, yellow gown, etc. Monitor gait and stability. Monitor for mental status changes and reorient to person, place, and time. Review medications for side effects contributing to fall risk. Reinforce activity limits and safety measures with patient and family. Provide visual clues: red socks.

## 2023-02-09 ENCOUNTER — RX RENEWAL (OUTPATIENT)
Age: 88
End: 2023-02-09

## 2023-02-26 ENCOUNTER — NON-APPOINTMENT (OUTPATIENT)
Age: 88
End: 2023-02-26

## 2023-02-28 LAB — GI PCR PANEL: NOT DETECTED

## 2023-03-01 ENCOUNTER — APPOINTMENT (OUTPATIENT)
Dept: INTERNAL MEDICINE | Facility: CLINIC | Age: 88
End: 2023-03-01
Payer: MEDICARE

## 2023-03-01 VITALS
WEIGHT: 122 LBS | BODY MASS INDEX: 24.6 KG/M2 | OXYGEN SATURATION: 94 % | RESPIRATION RATE: 14 BRPM | HEIGHT: 59 IN | HEART RATE: 73 BPM | DIASTOLIC BLOOD PRESSURE: 80 MMHG | TEMPERATURE: 98 F | SYSTOLIC BLOOD PRESSURE: 150 MMHG

## 2023-03-01 DIAGNOSIS — Z00.00 ENCOUNTER FOR GENERAL ADULT MEDICAL EXAMINATION W/OUT ABNORMAL FINDINGS: ICD-10-CM

## 2023-03-01 PROCEDURE — G0439: CPT

## 2023-03-01 RX ORDER — USTEKINUMAB 90 MG/ML
90 INJECTION, SOLUTION SUBCUTANEOUS
Qty: 1 | Refills: 5 | Status: DISCONTINUED | COMMUNITY
Start: 2021-10-25 | End: 2023-03-01

## 2023-03-01 NOTE — HEALTH RISK ASSESSMENT
[No] : No [0] : 2) Feeling down, depressed, or hopeless: Not at all (0) [PHQ-2 Negative - No further assessment needed] : PHQ-2 Negative - No further assessment needed [Former] : Former [> 15 Years] : > 15 Years [[id number]] : 0 [de-identified] : quit 1974

## 2023-03-01 NOTE — HISTORY OF PRESENT ILLNESS
[de-identified] : Had covid in October. May have had shingles around her eye, but went away with valtrex in 2 days.

## 2023-03-02 LAB
25(OH)D3 SERPL-MCNC: 47.1 NG/ML
ALBUMIN SERPL ELPH-MCNC: 4.2 G/DL
ALP BLD-CCNC: 99 U/L
ALT SERPL-CCNC: 19 U/L
ANION GAP SERPL CALC-SCNC: 12 MMOL/L
APPEARANCE: CLEAR
AST SERPL-CCNC: 24 U/L
BACTERIA: NEGATIVE
BASOPHILS # BLD AUTO: 0.04 K/UL
BASOPHILS NFR BLD AUTO: 0.4 %
BILIRUB SERPL-MCNC: 0.6 MG/DL
BILIRUBIN URINE: NEGATIVE
BLOOD URINE: NEGATIVE
BUN SERPL-MCNC: 18 MG/DL
CALCIUM SERPL-MCNC: 9.8 MG/DL
CHLORIDE SERPL-SCNC: 105 MMOL/L
CHOLEST SERPL-MCNC: 191 MG/DL
CO2 SERPL-SCNC: 26 MMOL/L
COLOR: YELLOW
CREAT SERPL-MCNC: 0.76 MG/DL
EGFR: 74 ML/MIN/1.73M2
EOSINOPHIL # BLD AUTO: 0.16 K/UL
EOSINOPHIL NFR BLD AUTO: 1.8 %
ESTIMATED AVERAGE GLUCOSE: 117 MG/DL
FOLATE SERPL-MCNC: >20 NG/ML
GLUCOSE QUALITATIVE U: NEGATIVE
GLUCOSE SERPL-MCNC: 83 MG/DL
HBA1C MFR BLD HPLC: 5.7 %
HCT VFR BLD CALC: 47.7 %
HDLC SERPL-MCNC: 72 MG/DL
HGB BLD-MCNC: 15 G/DL
HYALINE CASTS: 0 /LPF
IMM GRANULOCYTES NFR BLD AUTO: 0.2 %
KETONES URINE: NEGATIVE
LDLC SERPL CALC-MCNC: 102 MG/DL
LEUKOCYTE ESTERASE URINE: NEGATIVE
LYMPHOCYTES # BLD AUTO: 2.67 K/UL
LYMPHOCYTES NFR BLD AUTO: 29.7 %
MAN DIFF?: NORMAL
MCHC RBC-ENTMCNC: 29.9 PG
MCHC RBC-ENTMCNC: 31.4 GM/DL
MCV RBC AUTO: 95 FL
MICROSCOPIC-UA: NORMAL
MONOCYTES # BLD AUTO: 0.45 K/UL
MONOCYTES NFR BLD AUTO: 5 %
NEUTROPHILS # BLD AUTO: 5.66 K/UL
NEUTROPHILS NFR BLD AUTO: 62.9 %
NITRITE URINE: NEGATIVE
NONHDLC SERPL-MCNC: 119 MG/DL
PH URINE: 6
PLATELET # BLD AUTO: 231 K/UL
POTASSIUM SERPL-SCNC: 4.5 MMOL/L
PROT SERPL-MCNC: 6.8 G/DL
PROTEIN URINE: NORMAL
RBC # BLD: 5.02 M/UL
RBC # FLD: 13.5 %
RED BLOOD CELLS URINE: 3 /HPF
SODIUM SERPL-SCNC: 143 MMOL/L
SPECIFIC GRAVITY URINE: 1.02
SQUAMOUS EPITHELIAL CELLS: 2 /HPF
TRIGL SERPL-MCNC: 87 MG/DL
TSH SERPL-ACNC: 2.08 UIU/ML
URATE SERPL-MCNC: 3.9 MG/DL
UROBILINOGEN URINE: NORMAL
VIT B12 SERPL-MCNC: 911 PG/ML
WBC # FLD AUTO: 9 K/UL
WHITE BLOOD CELLS URINE: 2 /HPF

## 2023-03-03 LAB
C DIFF TOXIN B QL PCR REFLEX: NORMAL
GDH ANTIGEN: NOT DETECTED
TOXIN A AND B: NOT DETECTED

## 2023-03-06 ENCOUNTER — APPOINTMENT (OUTPATIENT)
Dept: GASTROENTEROLOGY | Facility: CLINIC | Age: 88
End: 2023-03-06
Payer: MEDICARE

## 2023-03-06 VITALS
HEART RATE: 74 BPM | DIASTOLIC BLOOD PRESSURE: 71 MMHG | BODY MASS INDEX: 24.48 KG/M2 | WEIGHT: 121.44 LBS | SYSTOLIC BLOOD PRESSURE: 179 MMHG | OXYGEN SATURATION: 94 % | HEIGHT: 59 IN

## 2023-03-06 DIAGNOSIS — D13.6 BENIGN NEOPLASM OF PANCREAS: ICD-10-CM

## 2023-03-06 DIAGNOSIS — K83.8 OTHER SPECIFIED DISEASES OF BILIARY TRACT: ICD-10-CM

## 2023-03-06 LAB
CALPROTECTIN FECAL: 36 UG/G
PANCREATIC ELASTASE, FECAL: 75 CD:794062645

## 2023-03-06 PROCEDURE — 99214 OFFICE O/P EST MOD 30 MIN: CPT

## 2023-04-19 ENCOUNTER — RX RENEWAL (OUTPATIENT)
Age: 88
End: 2023-04-19

## 2023-04-24 ENCOUNTER — APPOINTMENT (OUTPATIENT)
Dept: GASTROENTEROLOGY | Facility: CLINIC | Age: 88
End: 2023-04-24
Payer: MEDICARE

## 2023-04-24 VITALS
HEIGHT: 59 IN | OXYGEN SATURATION: 92 % | SYSTOLIC BLOOD PRESSURE: 172 MMHG | DIASTOLIC BLOOD PRESSURE: 78 MMHG | BODY MASS INDEX: 24.6 KG/M2 | HEART RATE: 78 BPM | WEIGHT: 122 LBS

## 2023-04-24 PROCEDURE — 99214 OFFICE O/P EST MOD 30 MIN: CPT

## 2023-04-24 RX ORDER — CHLORHEXIDINE GLUCONATE, 0.12% ORAL RINSE 1.2 MG/ML
0.12 SOLUTION DENTAL
Qty: 473 | Refills: 0 | Status: DISCONTINUED | COMMUNITY
Start: 2022-05-11 | End: 2023-04-24

## 2023-04-24 RX ORDER — KETOCONAZOLE 20 MG/G
2 CREAM TOPICAL
Qty: 60 | Refills: 0 | Status: DISCONTINUED | COMMUNITY
Start: 2022-09-26 | End: 2023-04-24

## 2023-04-24 NOTE — ASSESSMENT
[FreeTextEntry1] : Cindy is a very pleasant 89 year old female presenting for follow up for diarrhea and Crohn's colitis. she has pancreatic insufficiency from [facility]. Overall she had been doing well and has noted that with the increased dose of Creon, better control of her diarrhea.  However, she recently has had worsening symptoms and I suspect it is likely due to dietary indiscretion as well as inadequate dose of the Creon.  She has had trouble getting the medication due to insurance coverage issues.  She will try different brands of the pancreatic enzymes and will contact us after she speaks with her insurance company.  She will follow-up in 5 to 6 weeks\par \par \par We discussed today her options regarding adherence to the pancreatic enzymes. She has been doing well except for recent need for Augmentin for sinus infection. This may have resulted in GI symptoms but overall stable with current regimen.  We discussed the implications of untreated pancreatic insufficiency including recurrence of symptoms, potential malabsorption, and need to restrict diet.  I answered her questions to her satisfaction.

## 2023-04-24 NOTE — CONSULT LETTER
[Dear  ___] : Dear  [unfilled], [Courtesy Letter:] : I had the pleasure of seeing your patient, [unfilled], in my office today. [Consult Closing:] : Thank you very much for allowing me to participate in the care of this patient.  If you have any questions, please do not hesitate to contact me. [Sincerely,] : Sincerely, [FreeTextEntry1] : Cindy is a very pleasant 89 year old female presenting for follow up for diarrhea and Crohn's colitis. she has pancreatic insufficiency from [facility]. Overall she had been doing well and has noted that with the increased dose of Creon, better control of her diarrhea.  However, she recently has had worsening symptoms and I suspect it is likely due to dietary indiscretion as well as inadequate dose of the Creon.  She has had trouble getting the medication due to insurance coverage issues.  She will try different brands of the pancreatic enzymes and will contact us after she speaks with her insurance company.  She will follow-up in 5 to 6 weeks [FreeTextEntry3] : Nurys Rivera MD\par Gastroenterology, Hepatology and Motility\par

## 2023-04-24 NOTE — PHYSICAL EXAM
[General Appearance - In No Acute Distress] : in no acute distress [General Appearance - Well Nourished] : well nourished [General Appearance - Well-Appearing] : healthy appearing [Abdomen Soft] : soft [Abdomen Tenderness] : non-tender [Abdomen Mass (___ Cm)] : no abdominal mass palpated [Diffuse] : diffusely [Abnormal Walk] : normal gait [Nail Clubbing] : no clubbing  or cyanosis of the fingernails [Musculoskeletal - Swelling] : no joint swelling seen [Skin Color & Pigmentation] : normal skin color and pigmentation [] : no rash [Affect] : the affect was normal

## 2023-04-24 NOTE — HISTORY OF PRESENT ILLNESS
[de-identified] : Cindy is a very pleasant 89 year old female presenting for follow up today for diarrhea.  Reports 2 to 3 days of fecal urgency and watery diarrhea.  No blood or mucus no fevers or abdominal pain or other symptoms.  She does report having had a greasy meal preceding these bouts of diarrhea. \par RPA 12/5/22:  After a recent illness with COVID19. Shortly after having recovered from COVID, she developed a few pustules on her face and quickly saw her PCP. started on Valacyclovir. \par Crohn's disease for which she is maintained on Stelara. She also has severe pancreatic insufficiency for which she is being treated with Creon. reports bout of diarrhea after eating pasta. she has not been taking the Creon prior to snacks and has been limiting her diet. recent bout of fecal urgency and large loose BM . about 2 weeks ago after a wekend away. \par \par prior note 4/11: She previously had severe diarrhea and abdominal pain.She now reports occasional loose stool and fecal urgency which is mild and does not resemble the diarrhea she was having with the Crohn's. Bouts of diarrhea related to foods and she reports intolerance to greasy foods. also with bloating, pressure. Therefore I checked her for pancreatic insufficiency and fecal elastase came back positive (low level) \par \par From August 2020 note:\par She has a diagnosis of Crohn's disease, diagnosed about 9 months ago. At that time, she was hospitalized with GI bleed. She is having diarrhea complicated by hematochezia. She underwent endoscopic evaluation and colonoscopy in [place]. She had, per patient and her son, diagnosis of Crohn's disease requiring initiation of stone or after a course of prednisone. She had been doing well. She received her infusion and started the injection therapy in [place].She now reports that for the past several weeks, she has had recurrent lower abdominal cramping pain and loose stool. Intermittent diarrhea and food intolerance. She had some bloating and decreased appetite, mainly feared that her diarrhea will come back As a result, she has been limiting for choices.She is here to establish care now that she will be residing in [place].\par \par

## 2023-04-24 NOTE — REASON FOR VISIT
[Follow-Up: _____] : a [unfilled] follow-up visit [FreeTextEntry1] : diarrhea, fecal urgency, Crohn's colitis, pancreatic insufficiency, recent shingles of face preceded by COVID19 infection

## 2023-05-15 ENCOUNTER — APPOINTMENT (OUTPATIENT)
Dept: CARDIOLOGY | Facility: CLINIC | Age: 88
End: 2023-05-15
Payer: MEDICARE

## 2023-05-15 ENCOUNTER — NON-APPOINTMENT (OUTPATIENT)
Age: 88
End: 2023-05-15

## 2023-05-15 VITALS
DIASTOLIC BLOOD PRESSURE: 76 MMHG | HEIGHT: 59 IN | HEART RATE: 71 BPM | SYSTOLIC BLOOD PRESSURE: 161 MMHG | WEIGHT: 122 LBS | OXYGEN SATURATION: 91 % | BODY MASS INDEX: 24.6 KG/M2

## 2023-05-15 PROCEDURE — 93000 ELECTROCARDIOGRAM COMPLETE: CPT

## 2023-05-15 PROCEDURE — 99215 OFFICE O/P EST HI 40 MIN: CPT

## 2023-05-15 RX ORDER — AMLODIPINE BESYLATE 2.5 MG/1
2.5 TABLET ORAL DAILY
Qty: 30 | Refills: 2 | Status: DISCONTINUED | COMMUNITY
Start: 2021-02-17 | End: 2023-05-15

## 2023-05-15 RX ORDER — PANCRELIPASE 120000; 24000; 76000 [USP'U]/1; [USP'U]/1; [USP'U]/1
24000-76000 CAPSULE, DELAYED RELEASE PELLETS ORAL
Qty: 150 | Refills: 0 | Status: DISCONTINUED | COMMUNITY
Start: 2022-06-14 | End: 2023-05-15

## 2023-06-26 ENCOUNTER — APPOINTMENT (OUTPATIENT)
Dept: CARDIOLOGY | Facility: CLINIC | Age: 88
End: 2023-06-26
Payer: MEDICARE

## 2023-06-26 ENCOUNTER — NON-APPOINTMENT (OUTPATIENT)
Age: 88
End: 2023-06-26

## 2023-06-26 VITALS
SYSTOLIC BLOOD PRESSURE: 135 MMHG | WEIGHT: 124 LBS | BODY MASS INDEX: 25 KG/M2 | HEIGHT: 59 IN | HEART RATE: 70 BPM | DIASTOLIC BLOOD PRESSURE: 74 MMHG | OXYGEN SATURATION: 91 %

## 2023-06-26 PROCEDURE — 99215 OFFICE O/P EST HI 40 MIN: CPT

## 2023-06-26 PROCEDURE — 93000 ELECTROCARDIOGRAM COMPLETE: CPT

## 2023-07-03 ENCOUNTER — APPOINTMENT (OUTPATIENT)
Dept: INTERNAL MEDICINE | Facility: CLINIC | Age: 88
End: 2023-07-03
Payer: MEDICARE

## 2023-07-03 VITALS
DIASTOLIC BLOOD PRESSURE: 74 MMHG | SYSTOLIC BLOOD PRESSURE: 128 MMHG | OXYGEN SATURATION: 92 % | RESPIRATION RATE: 14 BRPM | TEMPERATURE: 97.3 F | HEART RATE: 76 BPM | WEIGHT: 122 LBS | HEIGHT: 59 IN | BODY MASS INDEX: 24.6 KG/M2

## 2023-07-03 DIAGNOSIS — R19.7 DIARRHEA, UNSPECIFIED: ICD-10-CM

## 2023-07-03 DIAGNOSIS — R19.09 OTHER INTRA-ABDOMINAL AND PELVIC SWELLING, MASS AND LUMP: ICD-10-CM

## 2023-07-03 PROCEDURE — 99214 OFFICE O/P EST MOD 30 MIN: CPT

## 2023-07-03 NOTE — HISTORY OF PRESENT ILLNESS
[FreeTextEntry1] : BP check [de-identified] : c/o diarrhea sometimes and Dr Rivera started her on pancreatic enzymes which are not helping. Stelara was stopped and she was doing a little better when taking that. \par \par Her amlodipine was increased to 10 mg and BP better. \par \par Gets occasional left breast discomfort for seconds when she lies on her side through breast.

## 2023-07-03 NOTE — HEALTH RISK ASSESSMENT
[No] : No [0] : 2) Feeling down, depressed, or hopeless: Not at all (0) [PHQ-2 Negative - No further assessment needed] : PHQ-2 Negative - No further assessment needed [[id number]] : 0 [Never] : Never

## 2023-07-05 ENCOUNTER — RX RENEWAL (OUTPATIENT)
Age: 88
End: 2023-07-05

## 2023-07-24 ENCOUNTER — LABORATORY RESULT (OUTPATIENT)
Age: 88
End: 2023-07-24

## 2023-07-24 ENCOUNTER — APPOINTMENT (OUTPATIENT)
Dept: ULTRASOUND IMAGING | Facility: CLINIC | Age: 88
End: 2023-07-24
Payer: MEDICARE

## 2023-07-24 ENCOUNTER — APPOINTMENT (OUTPATIENT)
Dept: GASTROENTEROLOGY | Facility: CLINIC | Age: 88
End: 2023-07-24
Payer: MEDICARE

## 2023-07-24 ENCOUNTER — OUTPATIENT (OUTPATIENT)
Dept: OUTPATIENT SERVICES | Facility: HOSPITAL | Age: 88
LOS: 1 days | End: 2023-07-24
Payer: MEDICARE

## 2023-07-24 VITALS
OXYGEN SATURATION: 97 % | WEIGHT: 122 LBS | BODY MASS INDEX: 24.6 KG/M2 | DIASTOLIC BLOOD PRESSURE: 82 MMHG | SYSTOLIC BLOOD PRESSURE: 171 MMHG | HEART RATE: 76 BPM | HEIGHT: 59 IN

## 2023-07-24 DIAGNOSIS — R30.0 DYSURIA: ICD-10-CM

## 2023-07-24 DIAGNOSIS — R39.9 UNSPECIFIED SYMPTOMS AND SIGNS INVOLVING THE GENITOURINARY SYSTEM: ICD-10-CM

## 2023-07-24 DIAGNOSIS — R31.0 GROSS HEMATURIA: ICD-10-CM

## 2023-07-24 DIAGNOSIS — R03.0 ELEVATED BLOOD-PRESSURE READING, W/OUT DIAGNOSIS OF HYPERTENSION: ICD-10-CM

## 2023-07-24 DIAGNOSIS — Z00.8 ENCOUNTER FOR OTHER GENERAL EXAMINATION: ICD-10-CM

## 2023-07-24 PROCEDURE — 99215 OFFICE O/P EST HI 40 MIN: CPT

## 2023-07-24 PROCEDURE — 76700 US EXAM ABDOM COMPLETE: CPT

## 2023-07-24 PROCEDURE — 76700 US EXAM ABDOM COMPLETE: CPT | Mod: 26

## 2023-07-24 NOTE — ASSESSMENT
[FreeTextEntry1] : Pleasant 89 year old woman with Hx Crohn's colitis (on Stelara), pancreatic insufficiency (with pancreatic cystic neoplasm; on Creon), diarrhea, and fecal urgency presents for follow-up; reports dysuria and hematuria. Reports no nausea, fever/chills, nor abdominal pain. Patient will try to obtain Creon from another pharmacy. Recommended patient obtain urine and blood test; recommended pelvic US, and urinalysis. \par \par All questions answered\par Call with any questions or concerns\par Time spent before and after visit reviewing patient's chart\par

## 2023-07-24 NOTE — PHYSICAL EXAM
[Hearing Threshold Finger Rub Not [place]] : hearing was normal [Normal Appearance] : the appearance of the neck was normal [None] : no edema [Normal Color / Pigmentation] : normal skin color and pigmentation [Cranial Nerves Intact] : cranial nerves 2-12 were intact [Oriented To Time, Place, And Person] : oriented to person, place, and time [Conjunctiva] : the conjunctiva were normal in both eyes [Normal Lips/Gums] : the lips and gums were normal [Oropharynx] : the oropharynx was normal [Normal] : normal bowel sounds, non-tender, no masses, soft, no no hepato-splenomegaly [Yellow Sclera (Icteric)] : the sclera were not icteric [Blue Sclera] : the sclera were not blue [Conjunctival Hyperemia Bilateral] : no hyperemia [Conjunctival Watery Discharge Both Eyes] : no watery discharge [Conjunctival Purulent Discharge Both Eyes] : no purulent discharge [Subconjunctival Hemorrhage Both Eyes] : no subconjunctival hemorrhages [Epiphora Both Eyes] : no increase in tearing

## 2023-07-24 NOTE — HISTORY OF PRESENT ILLNESS
[FreeTextEntry1] : Pleasant 89 year old woman with Hx Crohn's colitis (on Stelara), pancreatic insufficiency on Creon), diarrhea, and fecal urgency presents for follow-up; reports dysuria and hematuria. Reports no nausea, fever/chills, nor abdominal pain. Reports pharmacy ran out of Creon 3 weeks ago (reports no change in BMs); reports regular BMs but with incomplete evacuation.

## 2023-07-24 NOTE — REVIEW OF SYSTEMS
[Urinary Difficulties] : urinary difficulties [Negative] : Heme/Lymph [As Noted in HPI] : as noted in HPI [Genital Lesion] : no genital lesions [Testicular Pain] : no testicular pain [Pelvic Pain] : no pelvic pain [Rash] : no rash [Vaginal Bleeding] : no vaginal bleeding [FreeTextEntry8] : reports hematuria and dysuria (noticed blood this morning)

## 2023-07-24 NOTE — REASON FOR VISIT
[Follow-up] : a follow-up of an existing diagnosis [FreeTextEntry1] : Pt with Hx Crohn's colitis (on Stelara), pancreatic insufficiency on Creon), diarrhea, and fecal urgency presents for follow-up; reports dysuria and hematuria.

## 2023-07-25 ENCOUNTER — TRANSCRIPTION ENCOUNTER (OUTPATIENT)
Age: 88
End: 2023-07-25

## 2023-07-25 LAB
ALBUMIN SERPL ELPH-MCNC: 4.5 G/DL
ALP BLD-CCNC: 116 U/L
ALT SERPL-CCNC: 22 U/L
AMYLASE/CREAT SERPL: 81 U/L
ANION GAP SERPL CALC-SCNC: 13 MMOL/L
APPEARANCE: ABNORMAL
AST SERPL-CCNC: 28 U/L
BILIRUB SERPL-MCNC: 0.6 MG/DL
BILIRUBIN URINE: NEGATIVE
BLOOD URINE: ABNORMAL
BUN SERPL-MCNC: 22 MG/DL
CALCIUM SERPL-MCNC: 9.9 MG/DL
CANCER AG19-9 SERPL-ACNC: 7 U/ML
CHLORIDE SERPL-SCNC: 106 MMOL/L
CO2 SERPL-SCNC: 25 MMOL/L
COLOR: ABNORMAL
CREAT SERPL-MCNC: 0.72 MG/DL
EGFR: 79 ML/MIN/1.73M2
GLUCOSE QUALITATIVE U: NEGATIVE MG/DL
GLUCOSE SERPL-MCNC: 107 MG/DL
KETONES URINE: NEGATIVE MG/DL
LEUKOCYTE ESTERASE URINE: ABNORMAL
LPL SERPL-CCNC: 19 U/L
NITRITE URINE: NEGATIVE
PH URINE: 5.5
POTASSIUM SERPL-SCNC: 4.2 MMOL/L
PROT SERPL-MCNC: 7.5 G/DL
PROTEIN URINE: 100 MG/DL
SODIUM SERPL-SCNC: 143 MMOL/L
SPECIFIC GRAVITY URINE: 1.02
UROBILINOGEN URINE: 0.2 MG/DL
VIT B12 SERPL-MCNC: 832 PG/ML

## 2023-07-31 ENCOUNTER — APPOINTMENT (OUTPATIENT)
Dept: RADIOLOGY | Facility: CLINIC | Age: 88
End: 2023-07-31
Payer: MEDICARE

## 2023-07-31 ENCOUNTER — APPOINTMENT (OUTPATIENT)
Dept: UROLOGY | Facility: CLINIC | Age: 88
End: 2023-07-31
Payer: MEDICARE

## 2023-07-31 ENCOUNTER — OUTPATIENT (OUTPATIENT)
Dept: OUTPATIENT SERVICES | Facility: HOSPITAL | Age: 88
LOS: 1 days | End: 2023-07-31
Payer: MEDICARE

## 2023-07-31 VITALS
OXYGEN SATURATION: 94 % | HEIGHT: 59 IN | WEIGHT: 122 LBS | BODY MASS INDEX: 24.6 KG/M2 | DIASTOLIC BLOOD PRESSURE: 80 MMHG | HEART RATE: 82 BPM | SYSTOLIC BLOOD PRESSURE: 166 MMHG

## 2023-07-31 DIAGNOSIS — N20.0 CALCULUS OF KIDNEY: ICD-10-CM

## 2023-07-31 PROCEDURE — 74018 RADEX ABDOMEN 1 VIEW: CPT

## 2023-07-31 PROCEDURE — 99204 OFFICE O/P NEW MOD 45 MIN: CPT

## 2023-07-31 PROCEDURE — 74018 RADEX ABDOMEN 1 VIEW: CPT | Mod: 26

## 2023-08-01 LAB
APPEARANCE: CLEAR
BACTERIA: NEGATIVE /HPF
BILIRUBIN URINE: NEGATIVE
BLOOD URINE: NEGATIVE
CAST: 0 /LPF
COLOR: YELLOW
EPITHELIAL CELLS: 6 /HPF
GLUCOSE QUALITATIVE U: NEGATIVE MG/DL
KETONES URINE: NEGATIVE MG/DL
LEUKOCYTE ESTERASE URINE: ABNORMAL
MICROSCOPIC-UA: NORMAL
NITRITE URINE: NEGATIVE
PH URINE: 6
PROTEIN URINE: NORMAL MG/DL
RED BLOOD CELLS URINE: 1 /HPF
SPECIFIC GRAVITY URINE: 1.02
UROBILINOGEN URINE: 0.2 MG/DL
WHITE BLOOD CELLS URINE: 2 /HPF

## 2023-08-01 NOTE — ASSESSMENT
[FreeTextEntry1] : Ms. SCHMIDT is a 89 year White  F who comes today to clinic after presenting left sided flank pain of few days. US identifying a 7mm left kidney stone.  Today we discussed the patients options for the management of their stone:  1. Ureteroscopy and laser lithotripsy (URS/LL): This procedure involves trans-urethral access to the ureter and kidney using small cameras, lasers and other instruments. Patients usually will have a stent inserted at the end of the procedure, which can cause discomfort, urinary frequency, urgency, burning and bleeding. The risks include pain, bleeding, infection, damage to genitourinary organs, inability to access the ureter ureter, and need for multiple procedures. This method is preferable for ureteral stones and smaller kidney stones (<2 cm), and is less invasive.  2. Extra corporeal shock wave lithotripsy (ESWL): This procedure is performed using ultrasonic shock waves applied from outside the body to break the stone. Fragments of the stone will then pass on their own. The risks include bleeding, failure to completely break stones, ureteral obstruction (aka, steinstrasse) and need for multiple procedures. This method is the least invasive and has the lowest complication rate, but it also has the lowest rate of stone clearance.   3. Observation with medical expulsive therapy (MET): This method is appropriate for some renal and small ureteral stones (<1 cm). For kidney stones under observation, patients will require repeat imaging every 6-12 months to assess the size/location of the stone and identify any new stones. For stones that have moved into the ureter, a trial of passage may be appropriate if there is no infection and pain is well controlled. If observation with MET is unsuccessful after 4-6 weeks and/or the patient prefers to intervene sooner, they can proceed to definitive treatment. If the patient experiences signs of infection (fever, chills), renal failure, uncontrollable pain, and/or refractory vomiting, they should come to the emergency room.  After the above discussion, the patient opted to observe for now.

## 2023-08-01 NOTE — HISTORY OF PRESENT ILLNESS
[FreeTextEntry1] : Ms. SCHMIDT is a 89 year White  F who comes today to clinic after presenting left sided flank pain of few days. Patient passed a stone and brought it with her today for analysis.  US identifying a 7mm left kidney stone. No history of stones in the past.   Denies fevers, chills, LUTS, hematuria, AUR.

## 2023-08-01 NOTE — SIGNATURES
[TextEntry] : Daniel Marquez M.D. Robotic and Minimally Invasive Urologic Surgery | [facility] | [place] [facility]  of Urology | [place] [facility] | [address], [place], [place], [address] | [address] [place], [address] | [address], [place], [address] Tel: [phone] | Fax: [phone] | email: [email or web address]

## 2023-08-01 NOTE — REVIEW OF SYSTEMS
[Urine Infection (bladder/kidney)] : bladder/kidney infection [Pain after urination] : pain after urination [Blood in urine that you can see] : blood visible in urine [Wake up at night to urinate  How many times?  ___] : wakes up to urinate [unfilled] times during the night [Negative] : Heme/Lymph [Loss of interest] : denies loss of interest in sexual activity [Genital bacterial infection] : denies genital bacterial infection [Genital yeast infection] : denies genital yeast infection [Sexually Transmitted Disease (STD)] : denies sexually transmitted disease

## 2023-08-02 LAB — URINE CYTOLOGY: NORMAL

## 2023-08-04 LAB — BACTERIA UR CULT: NORMAL

## 2023-08-17 LAB
KIDNEY STONE SOURCE: NORMAL
NIDUS STONE QN: NORMAL
RESULT COMMENT: NORMAL

## 2023-09-05 ENCOUNTER — EMERGENCY (EMERGENCY)
Facility: HOSPITAL | Age: 88
LOS: 1 days | Discharge: HOME | End: 2023-09-05
Attending: EMERGENCY MEDICINE | Admitting: EMERGENCY MEDICINE
Payer: MEDICARE

## 2023-09-05 VITALS
TEMPERATURE: 97 F | RESPIRATION RATE: 16 BRPM | WEIGHT: 119.93 LBS | SYSTOLIC BLOOD PRESSURE: 155 MMHG | HEIGHT: 59 IN | OXYGEN SATURATION: 97 % | HEART RATE: 67 BPM | DIASTOLIC BLOOD PRESSURE: 70 MMHG

## 2023-09-05 VITALS
OXYGEN SATURATION: 98 % | HEART RATE: 68 BPM | DIASTOLIC BLOOD PRESSURE: 72 MMHG | SYSTOLIC BLOOD PRESSURE: 148 MMHG | TEMPERATURE: 98 F | RESPIRATION RATE: 16 BRPM

## 2023-09-05 PROCEDURE — 90715 TDAP VACCINE 7 YRS/> IM: CPT

## 2023-09-05 PROCEDURE — 99283 EMERGENCY DEPT VISIT LOW MDM: CPT | Mod: 25

## 2023-09-05 PROCEDURE — 12002 RPR S/N/AX/GEN/TRNK2.6-7.5CM: CPT

## 2023-09-05 PROCEDURE — 90471 IMMUNIZATION ADMIN: CPT

## 2023-09-05 PROCEDURE — G0168: CPT

## 2023-09-05 PROCEDURE — 99284 EMERGENCY DEPT VISIT MOD MDM: CPT | Mod: FS,25

## 2023-09-05 RX ORDER — TETANUS TOXOID, REDUCED DIPHTHERIA TOXOID AND ACELLULAR PERTUSSIS VACCINE, ADSORBED 5; 2.5; 8; 8; 2.5 [IU]/.5ML; [IU]/.5ML; UG/.5ML; UG/.5ML; UG/.5ML
0.5 SUSPENSION INTRAMUSCULAR ONCE
Refills: 0 | Status: COMPLETED | OUTPATIENT
Start: 2023-09-05 | End: 2023-09-05

## 2023-09-05 RX ORDER — TETANUS TOXOID, REDUCED DIPHTHERIA TOXOID AND ACELLULAR PERTUSSIS VACCINE, ADSORBED 5; 2.5; 8; 8; 2.5 [IU]/.5ML; [IU]/.5ML; UG/.5ML; UG/.5ML; UG/.5ML
0.5 SUSPENSION INTRAMUSCULAR ONCE
Refills: 0 | Status: DISCONTINUED | OUTPATIENT
Start: 2023-09-05 | End: 2023-09-05

## 2023-09-05 RX ADMIN — TETANUS TOXOID, REDUCED DIPHTHERIA TOXOID AND ACELLULAR PERTUSSIS VACCINE, ADSORBED 0.5 MILLILITER(S): 5; 2.5; 8; 8; 2.5 SUSPENSION INTRAMUSCULAR at 12:59

## 2023-09-05 NOTE — ED ADULT NURSE NOTE - OBJECTIVE STATEMENT
89 yo F w/ PMHx of HTN, Crohns presents to ED via EMS from [facility] at [facility] c/o wound to L forearm. Pt reports she was getting out of elevator, doors started closing, L arm got caught by door. Skin tear present to L forearm, bleeding controlled by EMS. Pt unsure of last tetanus shot date. Denies fall or other injuries. Pt denies any CP, SOB, cough, N/V, fever, chills, urinary complaints, constipation, diarrhea, HA, dizziness, weakness. Pt A&Ox4, lungs CTA, +central pulses. Abdomen soft, not tender, not distended. Ambulating w/ steady gait, safety and comfort maintained, no acute distress noted at this time. Pt denies any recent travel or known sick contacts.

## 2023-09-05 NOTE — ED PROVIDER NOTE - NS ED ATTENDING STATEMENT MOD
This was a shared visit with the NIGEL. I reviewed and verified the documentation and independently performed the documented:

## 2023-09-05 NOTE — ED ADULT NURSE NOTE - NSFALLHARMRISKINTERV_ED_ALL_ED
Communicate risk of Fall with Harm to all staff, patient, and family/Provide visual cue: red socks, yellow wristband, yellow gown, etc/Reinforce activity limits and safety measures with patient and family/Bed in lowest position, wheels locked, appropriate side rails in place/Call bell, personal items and telephone in reach/Instruct patient to call for assistance before getting out of bed/chair/stretcher/Non-slip footwear applied when patient is off stretcher/[place] to call system/Physically safe environment - no spills, clutter or unnecessary equipment/Purposeful Proactive Rounding/Room/bathroom lighting operational, light cord in reach

## 2023-09-05 NOTE — ED PROVIDER NOTE - CLINICAL SUMMARY MEDICAL DECISION MAKING FREE TEXT BOX
pt c/o left forearm skin tear s/p injured by elevator door at her assisted living. Patient denies any otehr injuries. unknown last tetanus.    Plan update tetanus repair skin tear with dermabond

## 2023-09-05 NOTE — ED PROVIDER NOTE - PATIENT PORTAL LINK FT
You can access the FollowMyHealth Patient Portal offered by [facility] by registering at the following website: [email or web address]. By joining [employer]’s FollowMyHealth portal, you will also be able to view your health information using other applications (apps) compatible with our system.

## 2023-09-05 NOTE — ED PROVIDER NOTE - CARE PROVIDER_API CALL
Garland June.  Plastic Surgery  [address] [place] [address]  Phone: [phone]  Fax: [phone]  Follow Up Time:

## 2023-09-05 NOTE — ED PROVIDER NOTE - OBJECTIVE STATEMENT
Pt is a 89-year-old female with past history of Crohn disease pancreatic insufficiency hypertension osteoporosis by EMS for laceration left forearm sustained while at independent living.  Patient states he is back to enter elevator and door closed and cut her forearm.  Patient states tetanus is not up-to-date denies any complaints.

## 2023-09-05 NOTE — ED PROVIDER NOTE - NSFOLLOWUPINSTRUCTIONS_ED_ALL_ED_FT
Follow up with wound clinic  return to er for any redness drainage fever    Skin Tear    WHAT YOU NEED TO KNOW:    What do I need to know about a skin tear? A skin tear occurs when the layers of weakened skin split open from an injury. It is important to treat and prevent skin tears to prevent infection.    What increases my risk for a skin tear?     and elderly ages    Chronic or critical illness    Long-term use of steroids  How is a skin tear treated? Treatment may include any of the following:    Medicines may be given to decrease pain or treat a bacterial infection.    Bandages such as moist gauze pads or wraps may be placed on your wound. Bandages will help protect your wound from more injury, and allow your wound to heal. Do not use adhesive bandages. These could stick to your wound and make your skin tear worse.    Stitches or medical glue may be used to close the wound so it can heal.    Debridement is removal of dead, damaged, or infected skin to help your wound heal correctly.  How can I help prevent a skin tear?    Clean, moisturize, and protect your skin. Baths, hot showers, and soap can dry your skin and increase your risk for skin tears. Take lukewarm showers, use mild soap as directed, and gently pat your skin dry. Use lotion to keep your skin moist after you shower. Wear long sleeves, pants, and protective footwear.    Move carefully. Ask for help if you cannot lift yourself. Do not drag your skin when you move.    Keep your home safe. Cover sharp corners, keep your pathways clear, and turn on lights so you can see clearly. Ask for more information if you have questions about home safety.    Drink liquids as directed. Ask your provider how much liquid to drink each day and which liquids are best for you. Liquids will help keep your skin moist and protected from another skin tear.    Eat high-protein foods to help with wound healing. Examples are lean meats, fish, low-fat dairy products, and beans.  When should I call my doctor?    You have a fever or chills.    Blood soaks through your bandage.    You have redness, swelling, pus, or a bad odor coming from your wound.    You have severe pain.    Your wound tears open again.    You have questions or concerns about your condition or care.  CARE AGREEMENT:    You have the right to help plan your care. Learn about your health condition and how it may be treated. Discuss treatment options with your healthcare providers to decide what care you want to receive. You always have the right to refuse treatment.

## 2023-09-05 NOTE — ED PROVIDER NOTE - MUSCULOSKELETAL, MLM
Spine appears normal, range of motion is not limited, no muscle or joint tenderness nrom of elbow nvi

## 2023-09-05 NOTE — ED PROVIDER NOTE - SKIN, MLM
Skin normal color for race, warm, dry left forearm about 6 cm skin tear able to well approximate no bleeding

## 2023-09-08 ENCOUNTER — OUTPATIENT (OUTPATIENT)
Dept: OUTPATIENT SERVICES | Facility: HOSPITAL | Age: 88
LOS: 1 days | Discharge: HOME | End: 2023-09-08
Payer: MEDICARE

## 2023-09-08 ENCOUNTER — APPOINTMENT (OUTPATIENT)
Dept: WOUND CARE | Facility: HOSPITAL | Age: 88
End: 2023-09-08
Payer: MEDICARE

## 2023-09-08 VITALS
DIASTOLIC BLOOD PRESSURE: 71 MMHG | HEIGHT: 59 IN | BODY MASS INDEX: 24.39 KG/M2 | HEART RATE: 64 BPM | SYSTOLIC BLOOD PRESSURE: 135 MMHG | OXYGEN SATURATION: 97 % | RESPIRATION RATE: 18 BRPM | TEMPERATURE: 98 F | WEIGHT: 121 LBS

## 2023-09-08 DIAGNOSIS — S51.809A UNSPECIFIED OPEN WOUND OF UNSPECIFIED FOREARM, INITIAL ENCOUNTER: ICD-10-CM

## 2023-09-08 DIAGNOSIS — Z78.9 OTHER SPECIFIED HEALTH STATUS: ICD-10-CM

## 2023-09-08 PROCEDURE — 99203 OFFICE O/P NEW LOW 30 MIN: CPT

## 2023-09-08 PROCEDURE — G0463: CPT

## 2023-09-08 RX ORDER — PANCRELIPASE LIPASE, PANCRELIPASE PROTEASE, PANCRELIPASE AMYLASE 40000; 126000; 168000 [USP'U]/1; [USP'U]/1; [USP'U]/1
40000-126000 CAPSULE, DELAYED RELEASE ORAL
Qty: 120 | Refills: 5 | Status: COMPLETED | COMMUNITY
Start: 2023-03-06 | End: 2023-09-08

## 2023-09-08 RX ORDER — ELECTROLYTES/DEXTROSE
SOLUTION, ORAL ORAL
Refills: 0 | Status: ACTIVE | COMMUNITY

## 2023-09-08 NOTE — ASSESSMENT
[FreeTextEntry2] : Infection prevention wound care Skin Integrity [FreeTextEntry3] : Initial Visit [FreeTextEntry4] : Patient seen by Dr Perkins advised to put Silver Alginate on open area QOD and to keep area clean, dry and protected. F/U in 1 week

## 2023-09-08 NOTE — VITALS
[] : No [de-identified] : 2/10 [FreeTextEntry3] : Left Forearm [FreeTextEntry1] : Nothing needed [FreeTextEntry2] : Changing the bandage [FreeTextEntry5] : Initial visit

## 2023-09-08 NOTE — HISTORY OF PRESENT ILLNESS
[FreeTextEntry1] : 89 yo WF, here as a new patient/evaluation for a left forearm laceration when she hit the arm on an elevator door pm 9/5/23. Seen at [place] [facility] and akin glue placed and pt  referred to the [facility].    RN note- MART SCHMIDT is being seen for a initial nursing assessment visit. Elevator door closed on patient opening up her fragile skin on her left forearm on 9/5/23, went to [facility] who advised her to come to wound care center. Patient accompanied by Self.

## 2023-09-08 NOTE — PHYSICAL EXAM
[4 x 4] : 4 x 4  [Normal Thyroid] : the thyroid was normal [Normal Breath Sounds] : Normal breath sounds [Wheezing] : wheezing was heard [Normal Heart Sounds] : normal heart sounds [Normal Rate and Rhythm] : normal rate and rhythm [Alert] : alert [Oriented to Person] : oriented to person [Oriented to Place] : oriented to place [Oriented to Time] : oriented to time [Calm] : calm [JVD] : no jugular venous distention  [Abdomen Masses] : No abdominal massess [Abdomen Tenderness] : ~T ~M No abdominal tenderness [Tender] : nontender [Enlarged] : not enlarged [de-identified] : elderly WF, NAD, alert, Ox3. [FreeTextEntry2] : 2.0 [FreeTextEntry1] : Left Forearm [FreeTextEntry3] : 0.5 [FreeTextEntry4] : 0.1 [de-identified] : Serosanguinous [de-identified] : Silver Alginate [de-identified] :  Mechanically cleansed with NS 0.9%, Sterile Gauze Kerlix [TWNoteComboBox4] : Small [TWNoteComboBox5] : No [TWNoteComboBox6] : Traumatic [de-identified] : No [de-identified] : Erythema [de-identified] : None [de-identified] : None [de-identified] : None [de-identified] : No [de-identified] : 3x Weekly [de-identified] : Primary Dressing

## 2023-09-10 DIAGNOSIS — K50.119 CROHN'S DISEASE OF LARGE INTESTINE WITH UNSPECIFIED COMPLICATIONS: ICD-10-CM

## 2023-09-10 DIAGNOSIS — S51.812D LACERATION WITHOUT FOREIGN BODY OF LEFT FOREARM, SUBSEQUENT ENCOUNTER: ICD-10-CM
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
## 2023-09-10 DIAGNOSIS — Z87.440 PERSONAL HISTORY OF URINARY (TRACT) INFECTIONS: ICD-10-CM

## 2023-09-10 DIAGNOSIS — I10 ESSENTIAL (PRIMARY) HYPERTENSION: ICD-10-CM

## 2023-09-10 DIAGNOSIS — Z80.1 FAMILY HISTORY OF MALIGNANT NEOPLASM OF TRACHEA, BRONCHUS AND LUNG: ICD-10-CM

## 2023-09-10 DIAGNOSIS — Z82.3 FAMILY HISTORY OF STROKE: ICD-10-CM

## 2023-09-10 DIAGNOSIS — R35.0 FREQUENCY OF MICTURITION: ICD-10-CM

## 2023-09-10 DIAGNOSIS — I34.0 NONRHEUMATIC MITRAL (VALVE) INSUFFICIENCY: ICD-10-CM
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
## 2023-09-10 DIAGNOSIS — Z98.890 OTHER SPECIFIED POSTPROCEDURAL STATES: ICD-10-CM
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
## 2023-09-10 DIAGNOSIS — Z79.899 OTHER LONG TERM (CURRENT) DRUG THERAPY: ICD-10-CM

## 2023-09-10 DIAGNOSIS — Z88.0 ALLERGY STATUS TO PENICILLIN: ICD-10-CM

## 2023-09-16 ENCOUNTER — APPOINTMENT (OUTPATIENT)
Dept: WOUND CARE | Facility: HOSPITAL | Age: 88
End: 2023-09-16
Payer: MEDICARE

## 2023-09-16 ENCOUNTER — OUTPATIENT (OUTPATIENT)
Dept: OUTPATIENT SERVICES | Facility: HOSPITAL | Age: 88
LOS: 1 days | Discharge: HOME | End: 2023-09-16
Payer: MEDICARE
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
## 2023-09-16 DIAGNOSIS — Z82.3 FAMILY HISTORY OF STROKE: ICD-10-CM

## 2023-09-16 DIAGNOSIS — S51.812D LACERATION WITHOUT FOREIGN BODY OF LEFT FOREARM, SUBSEQUENT ENCOUNTER: ICD-10-CM

## 2023-09-16 DIAGNOSIS — R35.0 FREQUENCY OF MICTURITION: ICD-10-CM

## 2023-09-16 DIAGNOSIS — K50.119 CROHN'S DISEASE OF LARGE INTESTINE WITH UNSPECIFIED COMPLICATIONS: ICD-10-CM

## 2023-09-16 DIAGNOSIS — Z87.440 PERSONAL HISTORY OF URINARY (TRACT) INFECTIONS: ICD-10-CM

## 2023-09-16 DIAGNOSIS — Z98.890 OTHER SPECIFIED POSTPROCEDURAL STATES: ICD-10-CM

## 2023-09-16 DIAGNOSIS — Z88.0 ALLERGY STATUS TO PENICILLIN: ICD-10-CM

## 2023-09-16 DIAGNOSIS — Z79.899 OTHER LONG TERM (CURRENT) DRUG THERAPY: ICD-10-CM

## 2023-09-16 DIAGNOSIS — S51.809A UNSPECIFIED OPEN WOUND OF UNSPECIFIED FOREARM, INITIAL ENCOUNTER: ICD-10-CM

## 2023-09-16 DIAGNOSIS — I10 ESSENTIAL (PRIMARY) HYPERTENSION: ICD-10-CM
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
## 2023-09-16 DIAGNOSIS — I34.0 NONRHEUMATIC MITRAL (VALVE) INSUFFICIENCY: ICD-10-CM

## 2023-09-16 DIAGNOSIS — Z80.1 FAMILY HISTORY OF MALIGNANT NEOPLASM OF TRACHEA, BRONCHUS AND LUNG: ICD-10-CM

## 2023-09-16 PROCEDURE — G0463: CPT

## 2023-09-16 PROCEDURE — 99213 OFFICE O/P EST LOW 20 MIN: CPT

## 2023-09-18 ENCOUNTER — APPOINTMENT (OUTPATIENT)
Dept: GASTROENTEROLOGY | Facility: CLINIC | Age: 88
End: 2023-09-18
Payer: MEDICARE

## 2023-09-18 VITALS
OXYGEN SATURATION: 96 % | HEIGHT: 59 IN | DIASTOLIC BLOOD PRESSURE: 65 MMHG | BODY MASS INDEX: 24.8 KG/M2 | SYSTOLIC BLOOD PRESSURE: 140 MMHG | HEART RATE: 75 BPM | WEIGHT: 123 LBS

## 2023-09-18 DIAGNOSIS — R15.2 FECAL URGENCY: ICD-10-CM

## 2023-09-18 DIAGNOSIS — K86.2 CYST OF PANCREAS: ICD-10-CM

## 2023-09-18 PROCEDURE — 99215 OFFICE O/P EST HI 40 MIN: CPT

## 2023-09-27 RX ORDER — PANCRELIPASE 36000; 180000; 114000 [USP'U]/1; [USP'U]/1; [USP'U]/1
36000-114000 CAPSULE, DELAYED RELEASE PELLETS ORAL
Qty: 500 | Refills: 3 | Status: ACTIVE | COMMUNITY
Start: 2022-03-12 | End: 1900-01-01

## 2023-09-30 ENCOUNTER — OUTPATIENT (OUTPATIENT)
Dept: OUTPATIENT SERVICES | Facility: HOSPITAL | Age: 88
LOS: 1 days | Discharge: HOME | End: 2023-09-30
Payer: MEDICARE

## 2023-09-30 ENCOUNTER — APPOINTMENT (OUTPATIENT)
Dept: WOUND CARE | Facility: HOSPITAL | Age: 88
End: 2023-09-30
Payer: MEDICARE

## 2023-09-30 VITALS
HEIGHT: 59 IN | SYSTOLIC BLOOD PRESSURE: 139 MMHG | HEART RATE: 70 BPM | BODY MASS INDEX: 24.8 KG/M2 | DIASTOLIC BLOOD PRESSURE: 76 MMHG | RESPIRATION RATE: 18 BRPM | OXYGEN SATURATION: 94 % | WEIGHT: 123 LBS | TEMPERATURE: 97.7 F

## 2023-09-30 DIAGNOSIS — S51.812D LACERATION WITHOUT FOREIGN BODY OF LEFT FOREARM, SUBSEQUENT ENCOUNTER: ICD-10-CM

## 2023-09-30 DIAGNOSIS — S91.309D UNSPECIFIED OPEN WOUND, UNSPECIFIED FOOT, SUBSEQUENT ENCOUNTER: ICD-10-CM

## 2023-09-30 PROCEDURE — G0463: CPT

## 2023-09-30 PROCEDURE — 99213 OFFICE O/P EST LOW 20 MIN: CPT
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
## 2023-10-01 DIAGNOSIS — I10 ESSENTIAL (PRIMARY) HYPERTENSION: ICD-10-CM

## 2023-10-01 DIAGNOSIS — Z82.3 FAMILY HISTORY OF STROKE: ICD-10-CM

## 2023-10-01 DIAGNOSIS — Z88.0 ALLERGY STATUS TO PENICILLIN: ICD-10-CM

## 2023-10-01 DIAGNOSIS — K50.119 CROHN'S DISEASE OF LARGE INTESTINE WITH UNSPECIFIED COMPLICATIONS: ICD-10-CM
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
## 2023-10-01 DIAGNOSIS — I34.0 NONRHEUMATIC MITRAL (VALVE) INSUFFICIENCY: ICD-10-CM

## 2023-10-01 DIAGNOSIS — Z98.890 OTHER SPECIFIED POSTPROCEDURAL STATES: ICD-10-CM

## 2023-10-01 DIAGNOSIS — Z80.1 FAMILY HISTORY OF MALIGNANT NEOPLASM OF TRACHEA, BRONCHUS AND LUNG: ICD-10-CM

## 2023-10-01 DIAGNOSIS — Z79.899 OTHER LONG TERM (CURRENT) DRUG THERAPY: ICD-10-CM
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
## 2023-10-01 DIAGNOSIS — Z87.440 PERSONAL HISTORY OF URINARY (TRACT) INFECTIONS: ICD-10-CM

## 2023-10-01 DIAGNOSIS — R35.0 FREQUENCY OF MICTURITION: ICD-10-CM

## 2023-10-01 DIAGNOSIS — S51.812D LACERATION WITHOUT FOREIGN BODY OF LEFT FOREARM, SUBSEQUENT ENCOUNTER: ICD-10-CM

## 2023-10-14 ENCOUNTER — OUTPATIENT (OUTPATIENT)
Dept: OUTPATIENT SERVICES | Facility: HOSPITAL | Age: 88
LOS: 1 days | Discharge: HOME | End: 2023-10-14
Payer: MEDICARE

## 2023-10-14 ENCOUNTER — APPOINTMENT (OUTPATIENT)
Dept: WOUND CARE | Facility: HOSPITAL | Age: 88
End: 2023-10-14
Payer: MEDICARE

## 2023-10-14 VITALS
SYSTOLIC BLOOD PRESSURE: 162 MMHG | TEMPERATURE: 97.8 F | OXYGEN SATURATION: 96 % | RESPIRATION RATE: 16 BRPM | BODY MASS INDEX: 24.8 KG/M2 | WEIGHT: 123 LBS | DIASTOLIC BLOOD PRESSURE: 75 MMHG | HEIGHT: 59 IN | HEART RATE: 70 BPM

## 2023-10-14 DIAGNOSIS — Z88.0 ALLERGY STATUS TO PENICILLIN: ICD-10-CM
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
## 2023-10-14 DIAGNOSIS — Z80.1 FAMILY HISTORY OF MALIGNANT NEOPLASM OF TRACHEA, BRONCHUS AND LUNG: ICD-10-CM

## 2023-10-14 DIAGNOSIS — R35.0 FREQUENCY OF MICTURITION: ICD-10-CM

## 2023-10-14 DIAGNOSIS — S91.309D UNSPECIFIED OPEN WOUND, UNSPECIFIED FOOT, SUBSEQUENT ENCOUNTER: ICD-10-CM

## 2023-10-14 DIAGNOSIS — I10 ESSENTIAL (PRIMARY) HYPERTENSION: ICD-10-CM

## 2023-10-14 DIAGNOSIS — Z82.3 FAMILY HISTORY OF STROKE: ICD-10-CM
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
## 2023-10-14 DIAGNOSIS — I34.0 NONRHEUMATIC MITRAL (VALVE) INSUFFICIENCY: ICD-10-CM

## 2023-10-14 DIAGNOSIS — Z79.899 OTHER LONG TERM (CURRENT) DRUG THERAPY: ICD-10-CM
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
## 2023-10-14 DIAGNOSIS — S51.812D LACERATION WITHOUT FOREIGN BODY OF LEFT FOREARM, SUBSEQUENT ENCOUNTER: ICD-10-CM

## 2023-10-14 DIAGNOSIS — S51.812D LACERATION W/OUT FOREIGN BODY OF LEFT FOREARM, SUBSEQUENT ENCOUNTER: ICD-10-CM
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
## 2023-10-14 DIAGNOSIS — Z87.440 PERSONAL HISTORY OF URINARY (TRACT) INFECTIONS: ICD-10-CM

## 2023-10-14 DIAGNOSIS — K50.119 CROHN'S DISEASE OF LARGE INTESTINE WITH UNSPECIFIED COMPLICATIONS: ICD-10-CM

## 2023-10-14 DIAGNOSIS — Z98.890 OTHER SPECIFIED POSTPROCEDURAL STATES: ICD-10-CM

## 2023-10-14 PROCEDURE — G0463: CPT

## 2023-10-14 PROCEDURE — 99213 OFFICE O/P EST LOW 20 MIN: CPT

## 2023-11-03 ENCOUNTER — NON-APPOINTMENT (OUTPATIENT)
Age: 88
End: 2023-11-03

## 2023-11-06 ENCOUNTER — APPOINTMENT (OUTPATIENT)
Dept: INTERNAL MEDICINE | Facility: CLINIC | Age: 88
End: 2023-11-06
Payer: MEDICARE

## 2023-11-06 ENCOUNTER — APPOINTMENT (OUTPATIENT)
Dept: GASTROENTEROLOGY | Facility: CLINIC | Age: 88
End: 2023-11-06

## 2023-11-06 VITALS
HEART RATE: 74 BPM | DIASTOLIC BLOOD PRESSURE: 66 MMHG | RESPIRATION RATE: 14 BRPM | SYSTOLIC BLOOD PRESSURE: 132 MMHG | OXYGEN SATURATION: 95 % | BODY MASS INDEX: 24.6 KG/M2 | HEIGHT: 59 IN | WEIGHT: 122 LBS

## 2023-11-06 DIAGNOSIS — R19.5 OTHER FECAL ABNORMALITIES: ICD-10-CM

## 2023-11-06 DIAGNOSIS — K50.119 CROHN'S DISEASE OF LARGE INTESTINE WITH UNSPECIFIED COMPLICATIONS: ICD-10-CM

## 2023-11-06 PROCEDURE — 99214 OFFICE O/P EST MOD 30 MIN: CPT

## 2023-11-07 LAB
ALBUMIN SERPL ELPH-MCNC: 4.3 G/DL
ALP BLD-CCNC: 107 U/L
ALT SERPL-CCNC: 17 U/L
ANION GAP SERPL CALC-SCNC: 12 MMOL/L
APPEARANCE: CLEAR
AST SERPL-CCNC: 23 U/L
BACTERIA UR CULT: NORMAL
BACTERIA: ABNORMAL /HPF
BASOPHILS # BLD AUTO: 0.04 K/UL
BASOPHILS NFR BLD AUTO: 0.4 %
BILIRUB SERPL-MCNC: 0.6 MG/DL
BILIRUBIN URINE: NEGATIVE
BLOOD URINE: NEGATIVE
BUN SERPL-MCNC: 24 MG/DL
CALCIUM SERPL-MCNC: 10.2 MG/DL
CAST: 2 /LPF
CHLORIDE SERPL-SCNC: 107 MMOL/L
CHOLEST SERPL-MCNC: 209 MG/DL
CO2 SERPL-SCNC: 26 MMOL/L
COLOR: YELLOW
CREAT SERPL-MCNC: 0.8 MG/DL
EGFR: 70 ML/MIN/1.73M2
EOSINOPHIL # BLD AUTO: 0.11 K/UL
EOSINOPHIL NFR BLD AUTO: 1.1 %
EPITHELIAL CELLS: 8 /HPF
ESTIMATED AVERAGE GLUCOSE: 114 MG/DL
GLUCOSE QUALITATIVE U: NEGATIVE MG/DL
GLUCOSE SERPL-MCNC: 89 MG/DL
HBA1C MFR BLD HPLC: 5.6 %
HCT VFR BLD CALC: 49 %
HDLC SERPL-MCNC: 81 MG/DL
HGB BLD-MCNC: 15.5 G/DL
IMM GRANULOCYTES NFR BLD AUTO: 0.3 %
KETONES URINE: ABNORMAL MG/DL
LDLC SERPL CALC-MCNC: 113 MG/DL
LEUKOCYTE ESTERASE URINE: ABNORMAL
LYMPHOCYTES # BLD AUTO: 2.36 K/UL
LYMPHOCYTES NFR BLD AUTO: 24.6 %
MAN DIFF?: NORMAL
MCHC RBC-ENTMCNC: 29.5 PG
MCHC RBC-ENTMCNC: 31.6 GM/DL
MCV RBC AUTO: 93.2 FL
MICROSCOPIC-UA: NORMAL
MONOCYTES # BLD AUTO: 0.51 K/UL
MONOCYTES NFR BLD AUTO: 5.3 %
NEUTROPHILS # BLD AUTO: 6.56 K/UL
NEUTROPHILS NFR BLD AUTO: 68.3 %
NITRITE URINE: NEGATIVE
NONHDLC SERPL-MCNC: 128 MG/DL
PH URINE: 5.5
PLATELET # BLD AUTO: 232 K/UL
POTASSIUM SERPL-SCNC: 5.1 MMOL/L
PROT SERPL-MCNC: 6.9 G/DL
PROTEIN URINE: NORMAL MG/DL
RBC # BLD: 5.26 M/UL
RBC # FLD: 13.5 %
RED BLOOD CELLS URINE: 2 /HPF
SODIUM SERPL-SCNC: 144 MMOL/L
SPECIFIC GRAVITY URINE: 1.02
TRIGL SERPL-MCNC: 83 MG/DL
UROBILINOGEN URINE: 0.2 MG/DL
WBC # FLD AUTO: 9.61 K/UL
WHITE BLOOD CELLS URINE: 4 /HPF

## 2023-11-09 LAB
C DIFF TOXIN B QL PCR REFLEX: NORMAL
GDH ANTIGEN: NOT DETECTED
TOXIN A AND B: NOT DETECTED

## 2023-11-17 ENCOUNTER — APPOINTMENT (OUTPATIENT)
Dept: GASTROENTEROLOGY | Facility: CLINIC | Age: 88
End: 2023-11-17
Payer: MEDICARE

## 2023-11-17 PROCEDURE — 99443: CPT | Mod: 95

## 2023-12-11 ENCOUNTER — APPOINTMENT (OUTPATIENT)
Dept: CARDIOLOGY | Facility: CLINIC | Age: 88
End: 2023-12-11
Payer: MEDICARE

## 2023-12-11 VITALS
DIASTOLIC BLOOD PRESSURE: 82 MMHG | HEART RATE: 68 BPM | SYSTOLIC BLOOD PRESSURE: 128 MMHG | WEIGHT: 126 LBS | BODY MASS INDEX: 25.4 KG/M2 | HEIGHT: 59 IN | OXYGEN SATURATION: 96 %

## 2023-12-11 DIAGNOSIS — R94.31 ABNORMAL ELECTROCARDIOGRAM [ECG] [EKG]: ICD-10-CM

## 2023-12-11 DIAGNOSIS — I35.1 NONRHEUMATIC AORTIC (VALVE) INSUFFICIENCY: ICD-10-CM

## 2023-12-11 PROCEDURE — 93000 ELECTROCARDIOGRAM COMPLETE: CPT

## 2023-12-11 PROCEDURE — 99215 OFFICE O/P EST HI 40 MIN: CPT

## 2023-12-27 ENCOUNTER — RX RENEWAL (OUTPATIENT)
Age: 88
End: 2023-12-27

## 2023-12-27 RX ORDER — LOSARTAN POTASSIUM 100 MG/1
100 TABLET, FILM COATED ORAL DAILY
Qty: 90 | Refills: 1 | Status: ACTIVE | COMMUNITY
Start: 2020-09-23 | End: 1900-01-01

## 2024-02-05 ENCOUNTER — APPOINTMENT (OUTPATIENT)
Dept: INTERNAL MEDICINE | Facility: CLINIC | Age: 89
End: 2024-02-05
Payer: MEDICARE

## 2024-02-05 VITALS
WEIGHT: 120 LBS | SYSTOLIC BLOOD PRESSURE: 112 MMHG | HEART RATE: 74 BPM | BODY MASS INDEX: 24.19 KG/M2 | DIASTOLIC BLOOD PRESSURE: 66 MMHG | RESPIRATION RATE: 16 BRPM | OXYGEN SATURATION: 93 % | TEMPERATURE: 97.8 F | HEIGHT: 59 IN

## 2024-02-05 VITALS — OXYGEN SATURATION: 96 %

## 2024-02-05 DIAGNOSIS — I34.0 NONRHEUMATIC MITRAL (VALVE) INSUFFICIENCY: ICD-10-CM

## 2024-02-05 DIAGNOSIS — K86.89 OTHER SPECIFIED DISEASES OF PANCREAS: ICD-10-CM

## 2024-02-05 DIAGNOSIS — R06.00 DYSPNEA, UNSPECIFIED: ICD-10-CM

## 2024-02-05 DIAGNOSIS — I10 ESSENTIAL (PRIMARY) HYPERTENSION: ICD-10-CM

## 2024-02-05 DIAGNOSIS — R31.9 HEMATURIA, UNSPECIFIED: ICD-10-CM

## 2024-02-05 DIAGNOSIS — N20.0 CALCULUS OF KIDNEY: ICD-10-CM

## 2024-02-05 PROCEDURE — 99214 OFFICE O/P EST MOD 30 MIN: CPT

## 2024-02-05 PROCEDURE — G2211 COMPLEX E/M VISIT ADD ON: CPT

## 2024-02-05 NOTE — ASSESSMENT
[FreeTextEntry1] : shortness of breath pt not sure is subjective just feels like she needs a deep breath at times follow up with Dr Cheney  pancreatic insufficiency and Crohns she is taking Creon f/u with Dr Rivera  HTN continue atenolol and losartan and amlodipine

## 2024-02-05 NOTE — HISTORY OF PRESENT ILLNESS
[FreeTextEntry1] : BP check [de-identified] : Pt states that she notices that she has a little sob with exertion for a couple weeks. Has HTN which is controlled.  She is taking creon for pancreatic insufficiency. She has not noticed a major difference in the diarrhea. She was on Stelara.  Pt had gross hematuria and dx with stone. Has appt in 2 days with her urologist.

## 2024-02-06 LAB
ALBUMIN SERPL ELPH-MCNC: 4.3 G/DL
ALP BLD-CCNC: 126 U/L
ALT SERPL-CCNC: 17 U/L
ANION GAP SERPL CALC-SCNC: 12 MMOL/L
AST SERPL-CCNC: 23 U/L
BASOPHILS # BLD AUTO: 0.05 K/UL
BASOPHILS NFR BLD AUTO: 0.6 %
BILIRUB SERPL-MCNC: 0.5 MG/DL
BUN SERPL-MCNC: 25 MG/DL
CALCIUM SERPL-MCNC: 9.5 MG/DL
CHLORIDE SERPL-SCNC: 105 MMOL/L
CHOLEST SERPL-MCNC: 198 MG/DL
CO2 SERPL-SCNC: 25 MMOL/L
CREAT SERPL-MCNC: 0.76 MG/DL
EGFR: 74 ML/MIN/1.73M2
EOSINOPHIL # BLD AUTO: 0.3 K/UL
EOSINOPHIL NFR BLD AUTO: 3.5 %
ESTIMATED AVERAGE GLUCOSE: 114 MG/DL
FOLATE SERPL-MCNC: >20 NG/ML
GLUCOSE SERPL-MCNC: 91 MG/DL
HBA1C MFR BLD HPLC: 5.6 %
HCT VFR BLD CALC: 50.3 %
HDLC SERPL-MCNC: 68 MG/DL
HGB BLD-MCNC: 15.9 G/DL
IMM GRANULOCYTES NFR BLD AUTO: 0.2 %
LDLC SERPL CALC-MCNC: 112 MG/DL
LYMPHOCYTES # BLD AUTO: 2.34 K/UL
LYMPHOCYTES NFR BLD AUTO: 27.6 %
MAN DIFF?: NORMAL
MCHC RBC-ENTMCNC: 29.2 PG
MCHC RBC-ENTMCNC: 31.6 GM/DL
MCV RBC AUTO: 92.5 FL
MONOCYTES # BLD AUTO: 0.7 K/UL
MONOCYTES NFR BLD AUTO: 8.3 %
NEUTROPHILS # BLD AUTO: 5.07 K/UL
NEUTROPHILS NFR BLD AUTO: 59.8 %
NONHDLC SERPL-MCNC: 130 MG/DL
PLATELET # BLD AUTO: 265 K/UL
POTASSIUM SERPL-SCNC: 4.4 MMOL/L
PROT SERPL-MCNC: 6.9 G/DL
RBC # BLD: 5.44 M/UL
RBC # FLD: 13.2 %
SODIUM SERPL-SCNC: 142 MMOL/L
TRIGL SERPL-MCNC: 99 MG/DL
TSH SERPL-ACNC: 1.63 UIU/ML
VIT B12 SERPL-MCNC: 983 PG/ML
WBC # FLD AUTO: 8.48 K/UL

## 2024-02-12 ENCOUNTER — APPOINTMENT (OUTPATIENT)
Dept: GASTROENTEROLOGY | Facility: CLINIC | Age: 89
End: 2024-02-12

## 2024-02-14 ENCOUNTER — APPOINTMENT (OUTPATIENT)
Dept: UROLOGY | Facility: CLINIC | Age: 89
End: 2024-02-14

## 2024-03-08 ENCOUNTER — RX RENEWAL (OUTPATIENT)
Age: 89
End: 2024-03-08

## 2024-03-08 RX ORDER — AMLODIPINE BESYLATE 10 MG/1
10 TABLET ORAL DAILY
Qty: 90 | Refills: 3 | Status: ACTIVE | COMMUNITY
Start: 2020-10-23 | End: 1900-01-01

## 2024-05-06 ENCOUNTER — APPOINTMENT (OUTPATIENT)
Dept: INTERNAL MEDICINE | Facility: CLINIC | Age: 89
End: 2024-05-06

## 2024-05-12 ENCOUNTER — RX RENEWAL (OUTPATIENT)
Age: 89
End: 2024-05-12

## 2024-05-12 RX ORDER — ATENOLOL 25 MG/1
25 TABLET ORAL
Qty: 90 | Refills: 1 | Status: ACTIVE | COMMUNITY
Start: 2020-09-23 | End: 1900-01-01

## 2024-05-12 RX ORDER — AMLODIPINE BESYLATE 5 MG/1
5 TABLET ORAL
Qty: 30 | Refills: 5 | Status: ACTIVE | COMMUNITY
Start: 2024-05-12 | End: 1900-01-01

## 2024-06-11 ENCOUNTER — APPOINTMENT (OUTPATIENT)
Dept: CARDIOLOGY | Facility: CLINIC | Age: 89
End: 2024-06-11

## 2024-08-27 ENCOUNTER — RX RENEWAL (OUTPATIENT)
Age: 89
End: 2024-08-27

## 2024-12-04 NOTE — ED PROVIDER NOTE - MDM ORDERS SUBMITTED SELECTION
[facility]  [address] [place] [address]               Farhad Wright  [address]  [facility] [id number]      12/4/2024    Dear Farhad Wright:               Re:   Your screening low-dose chest CT done on: 11/22/24          Report sent to: Srinivasan Emmanuel MD      We are writing to let you know that your recent low-dose lung screening CT shows small lung nodules which are likely benign (not cancer). Lung nodules are very common and many people without cancer have these nodules. To make sure these nodules are benign, screening guidelines recommend you have another low-dose chest CT on or around: 1 year or 11/23/25.    There were coronary artery calcifications also noted, which may require further evaluation. This does not necessarily mean there is a serious problem; however, it should not be ignored.    Please contact your health care provider to discuss these results and the next step in your medical care.    Here are some other important points you should know:  Although low-dose chest CT is very effective at finding lung cancer early, it cannot find all lung cancers. If you develop any new symptoms such as shortness of breath, chest pain, or coughing up blood, please call your doctor.    Please keep in mind that maintaining good health involves an annual physical exam, continued screening with low-dose chest CT, and if you smoke, quitting.  We understand that quitting is difficult, for help call [phone].    Contact your provider for any questions about your results.     Sincerely,    [facility] Lung CT Screening Team     Labs

## 2025-02-27 ENCOUNTER — RX RENEWAL (OUTPATIENT)
Age: 89
End: 2025-02-27

## 2025-04-27 ENCOUNTER — INPATIENT (INPATIENT)
Facility: HOSPITAL | Age: 89
LOS: 4 days | Discharge: NURSING, REHAB OR LONG-TERM CARE | DRG: 190 | End: 2025-05-02
Attending: INTERNAL MEDICINE | Admitting: INTERNAL MEDICINE
Payer: MEDICARE

## 2025-04-27 VITALS
TEMPERATURE: 99 F | RESPIRATION RATE: 22 BRPM | WEIGHT: 125.66 LBS | HEART RATE: 97 BPM | HEIGHT: 59 IN | OXYGEN SATURATION: 91 % | SYSTOLIC BLOOD PRESSURE: 134 MMHG | DIASTOLIC BLOOD PRESSURE: 65 MMHG

## 2025-04-27 LAB
ALBUMIN SERPL ELPH-MCNC: 3.4 G/DL — SIGNIFICANT CHANGE UP (ref 3.3–5)
ALP SERPL-CCNC: 130 U/L — HIGH (ref 30–120)
ALT FLD-CCNC: 23 U/L — SIGNIFICANT CHANGE UP (ref 10–60)
ANION GAP SERPL CALC-SCNC: 9 MMOL/L — SIGNIFICANT CHANGE UP (ref 5–17)
AST SERPL-CCNC: 23 U/L — SIGNIFICANT CHANGE UP (ref 10–40)
BASOPHILS # BLD AUTO: 0.03 K/UL — SIGNIFICANT CHANGE UP (ref 0–0.2)
BASOPHILS NFR BLD AUTO: 0.2 % — SIGNIFICANT CHANGE UP (ref 0–2)
BILIRUB SERPL-MCNC: 0.6 MG/DL — SIGNIFICANT CHANGE UP (ref 0.2–1.2)
BUN SERPL-MCNC: 28 MG/DL — HIGH (ref 7–23)
CALCIUM SERPL-MCNC: 9.5 MG/DL — SIGNIFICANT CHANGE UP (ref 8.4–10.5)
CHLORIDE SERPL-SCNC: 103 MMOL/L — SIGNIFICANT CHANGE UP (ref 96–108)
CO2 SERPL-SCNC: 28 MMOL/L — SIGNIFICANT CHANGE UP (ref 22–31)
CREAT SERPL-MCNC: 0.92 MG/DL — SIGNIFICANT CHANGE UP (ref 0.5–1.3)
EGFR: 58 ML/MIN/1.73M2 — LOW
EGFR: 58 ML/MIN/1.73M2 — LOW
EOSINOPHIL # BLD AUTO: 0.07 K/UL — SIGNIFICANT CHANGE UP (ref 0–0.5)
EOSINOPHIL NFR BLD AUTO: 0.5 % — SIGNIFICANT CHANGE UP (ref 0–6)
GLUCOSE SERPL-MCNC: 160 MG/DL — HIGH (ref 70–99)
HCT VFR BLD CALC: 45.6 % — HIGH (ref 34.5–45)
HGB BLD-MCNC: 15.3 G/DL — SIGNIFICANT CHANGE UP (ref 11.5–15.5)
IMM GRANULOCYTES NFR BLD AUTO: 0.2 % — SIGNIFICANT CHANGE UP (ref 0–0.9)
LYMPHOCYTES # BLD AUTO: 1.5 K/UL — SIGNIFICANT CHANGE UP (ref 1–3.3)
LYMPHOCYTES # BLD AUTO: 11.5 % — LOW (ref 13–44)
MCHC RBC-ENTMCNC: 30.4 PG — SIGNIFICANT CHANGE UP (ref 27–34)
MCHC RBC-ENTMCNC: 33.6 G/DL — SIGNIFICANT CHANGE UP (ref 32–36)
MCV RBC AUTO: 90.5 FL — SIGNIFICANT CHANGE UP (ref 80–100)
MONOCYTES # BLD AUTO: 0.8 K/UL — SIGNIFICANT CHANGE UP (ref 0–0.9)
MONOCYTES NFR BLD AUTO: 6.1 % — SIGNIFICANT CHANGE UP (ref 2–14)
NEUTROPHILS # BLD AUTO: 10.67 K/UL — HIGH (ref 1.8–7.4)
NEUTROPHILS NFR BLD AUTO: 81.5 % — HIGH (ref 43–77)
NRBC BLD AUTO-RTO: 0 /100 WBCS — SIGNIFICANT CHANGE UP (ref 0–0)
PLATELET # BLD AUTO: 248 K/UL — SIGNIFICANT CHANGE UP (ref 150–400)
POTASSIUM SERPL-MCNC: 4.4 MMOL/L — SIGNIFICANT CHANGE UP (ref 3.5–5.3)
POTASSIUM SERPL-SCNC: 4.4 MMOL/L — SIGNIFICANT CHANGE UP (ref 3.5–5.3)
PROT SERPL-MCNC: 7.8 G/DL — SIGNIFICANT CHANGE UP (ref 6–8.3)
RBC # BLD: 5.04 M/UL — SIGNIFICANT CHANGE UP (ref 3.8–5.2)
RBC # FLD: 13.1 % — SIGNIFICANT CHANGE UP (ref 10.3–14.5)
SODIUM SERPL-SCNC: 140 MMOL/L — SIGNIFICANT CHANGE UP (ref 135–145)
WBC # BLD: 13.1 K/UL — HIGH (ref 3.8–10.5)
WBC # FLD AUTO: 13.1 K/UL — HIGH (ref 3.8–10.5)

## 2025-04-27 PROCEDURE — 71045 X-RAY EXAM CHEST 1 VIEW: CPT | Mod: 26

## 2025-04-27 PROCEDURE — 71250 CT THORAX DX C-: CPT | Mod: 26

## 2025-04-27 PROCEDURE — 93010 ELECTROCARDIOGRAM REPORT: CPT

## 2025-04-27 PROCEDURE — 99285 EMERGENCY DEPT VISIT HI MDM: CPT

## 2025-04-27 RX ORDER — DOXYCYCLINE HYCLATE 100 MG
1 TABLET ORAL
Qty: 20 | Refills: 0
Start: 2025-04-27 | End: 2025-05-06

## 2025-04-27 RX ORDER — DEXAMETHASONE 0.5 MG/1
6 TABLET ORAL ONCE
Refills: 0 | Status: COMPLETED | OUTPATIENT
Start: 2025-04-27 | End: 2025-04-27

## 2025-04-27 RX ORDER — CEPHALEXIN 250 MG/1
1 CAPSULE ORAL
Refills: 0
Start: 2025-04-27

## 2025-04-27 RX ORDER — IPRATROPIUM BROMIDE AND ALBUTEROL SULFATE .5; 2.5 MG/3ML; MG/3ML
3 SOLUTION RESPIRATORY (INHALATION) ONCE
Refills: 0 | Status: COMPLETED | OUTPATIENT
Start: 2025-04-27 | End: 2025-04-27

## 2025-04-27 RX ADMIN — Medication 500 MILLILITER(S): at 21:23

## 2025-04-27 RX ADMIN — DEXAMETHASONE 6 MILLIGRAM(S): 0.5 TABLET ORAL at 21:23

## 2025-04-27 RX ADMIN — Medication 500 MILLILITER(S): at 23:00

## 2025-04-27 RX ADMIN — Medication 1 LOZENGE: at 21:24

## 2025-04-27 RX ADMIN — IPRATROPIUM BROMIDE AND ALBUTEROL SULFATE 3 MILLILITER(S): .5; 2.5 SOLUTION RESPIRATORY (INHALATION) at 21:35

## 2025-04-27 NOTE — ED ADULT NURSE NOTE - OBJECTIVE STATEMENT
Pt is alert and oriented. Pt states that she has had a "cold" for 3 days Pt states that she has a sore throat and productive cough. Pt states that her phlegm is green. Pt states that Pt denies sob, chest pain, nausea, vomiting, and dizziness. Pt resp are even and unlabored, skin color eliazar for race. Pt updated on plan of care. Pt placed on cm. Tele tech aware.

## 2025-04-27 NOTE — ED ADULT TRIAGE NOTE - CHIEF COMPLAINT QUOTE
"I had a sore throat 5 days ago. Today my throat is killing me and now I hear a rattle when I breathe and cough"

## 2025-04-27 NOTE — ED PROVIDER NOTE - CLINICAL SUMMARY MEDICAL DECISION MAKING FREE TEXT BOX
89F PMH HTN, crohns p/w sore throat and cough that started 5 days. Pt has a cough and feels "rattling" inher chest. Denies chest pain, SOB, fevers. Pt was [facility] and was tested negative for flu/covid. Pt is already s/p tonsillectomy    Gen: NAD, non-toxic appearing, awake alert   HEENT: normal uvula, uvula midline, no LAD, no stridor, normal conjunctiva, oral mucosa moist  Lung: CTAB, no respiratory distress, no wheezes/rhonchi/rales B/L, speaking in full sentences  CV: RRR  Abd: soft, NT, ND, no guarding, no rigidity, no rebound tenderness, no CVA tenderness   MSK: no visible deformities  Skin: Warm, well perfused    DDx includes but not limited to: likely viral uri vs pna. Low concern for deep space neck infx  Plan: pt would like to hold off on repeating viral swabs. Labs, cxr, symptomatic control, reassess symptoms    See Progress Notes for further updates on ED Course

## 2025-04-27 NOTE — ED ADULT NURSE NOTE - NSFALLHARMRISKINTERV_ED_ALL_ED

## 2025-04-27 NOTE — ED PROVIDER NOTE - PROGRESS NOTE DETAILS
when ambulating, pt's O2 sat dropped to 86% on RA. CT shows no pna. explained to pt and son at bedside. pt is willing to be admitted. endorsed to dr. watts for tele admission

## 2025-04-28 DIAGNOSIS — J44.0 CHRONIC OBSTRUCTIVE PULMONARY DISEASE WITH (ACUTE) LOWER RESPIRATORY INFECTION: ICD-10-CM

## 2025-04-28 DIAGNOSIS — M81.0 AGE-RELATED OSTEOPOROSIS WITHOUT CURRENT PATHOLOGICAL FRACTURE: ICD-10-CM

## 2025-04-28 DIAGNOSIS — R73.09 OTHER ABNORMAL GLUCOSE: ICD-10-CM

## 2025-04-28 DIAGNOSIS — I10 ESSENTIAL (PRIMARY) HYPERTENSION: ICD-10-CM

## 2025-04-28 DIAGNOSIS — R09.02 HYPOXEMIA: ICD-10-CM

## 2025-04-28 DIAGNOSIS — Z29.9 ENCOUNTER FOR PROPHYLACTIC MEASURES, UNSPECIFIED: ICD-10-CM

## 2025-04-28 LAB
ANION GAP SERPL CALC-SCNC: 7 MMOL/L — SIGNIFICANT CHANGE UP (ref 5–17)
BASE EXCESS BLDV CALC-SCNC: 3.4 MMOL/L — HIGH (ref -2–3)
BASOPHILS # BLD AUTO: 0 K/UL — SIGNIFICANT CHANGE UP (ref 0–0.2)
BASOPHILS NFR BLD AUTO: 0 % — SIGNIFICANT CHANGE UP (ref 0–2)
BUN SERPL-MCNC: 21 MG/DL — SIGNIFICANT CHANGE UP (ref 7–23)
CALCIUM SERPL-MCNC: 8.9 MG/DL — SIGNIFICANT CHANGE UP (ref 8.4–10.5)
CHLORIDE SERPL-SCNC: 108 MMOL/L — SIGNIFICANT CHANGE UP (ref 96–108)
CO2 SERPL-SCNC: 27 MMOL/L — SIGNIFICANT CHANGE UP (ref 22–31)
CREAT SERPL-MCNC: 0.73 MG/DL — SIGNIFICANT CHANGE UP (ref 0.5–1.3)
CRP SERPL-MCNC: 121 MG/L — HIGH
EGFR: 77 ML/MIN/1.73M2 — SIGNIFICANT CHANGE UP
EGFR: 77 ML/MIN/1.73M2 — SIGNIFICANT CHANGE UP
EOSINOPHIL # BLD AUTO: 0 K/UL — SIGNIFICANT CHANGE UP (ref 0–0.5)
EOSINOPHIL NFR BLD AUTO: 0 % — SIGNIFICANT CHANGE UP (ref 0–6)
FLUAV AG NPH QL: SIGNIFICANT CHANGE UP
FLUBV AG NPH QL: SIGNIFICANT CHANGE UP
GAS PNL BLDV: SIGNIFICANT CHANGE UP
GLUCOSE SERPL-MCNC: 150 MG/DL — HIGH (ref 70–99)
HCO3 BLDV-SCNC: 28 MMOL/L — SIGNIFICANT CHANGE UP (ref 22–29)
HCT VFR BLD CALC: 40.6 % — SIGNIFICANT CHANGE UP (ref 34.5–45)
HGB BLD-MCNC: 13.2 G/DL — SIGNIFICANT CHANGE UP (ref 11.5–15.5)
LYMPHOCYTES # BLD AUTO: 1.32 K/UL — SIGNIFICANT CHANGE UP (ref 1–3.3)
LYMPHOCYTES # BLD AUTO: 15 % — SIGNIFICANT CHANGE UP (ref 13–44)
MANUAL SMEAR VERIFICATION: SIGNIFICANT CHANGE UP
MCHC RBC-ENTMCNC: 29.8 PG — SIGNIFICANT CHANGE UP (ref 27–34)
MCHC RBC-ENTMCNC: 32.5 G/DL — SIGNIFICANT CHANGE UP (ref 32–36)
MCV RBC AUTO: 91.6 FL — SIGNIFICANT CHANGE UP (ref 80–100)
MONOCYTES # BLD AUTO: 0.09 K/UL — SIGNIFICANT CHANGE UP (ref 0–0.9)
MONOCYTES NFR BLD AUTO: 1 % — LOW (ref 2–14)
NEUTROPHILS # BLD AUTO: 7.41 K/UL — HIGH (ref 1.8–7.4)
NEUTROPHILS NFR BLD AUTO: 81 % — HIGH (ref 43–77)
NEUTS BAND # BLD: 3 % — SIGNIFICANT CHANGE UP (ref 0–8)
NEUTS BAND NFR BLD: 3 % — SIGNIFICANT CHANGE UP (ref 0–8)
NRBC # BLD: 0 /100 WBCS — SIGNIFICANT CHANGE UP (ref 0–0)
NRBC BLD AUTO-RTO: SIGNIFICANT CHANGE UP /100 WBCS (ref 0–0)
NRBC BLD-RTO: 0 /100 WBCS — SIGNIFICANT CHANGE UP (ref 0–0)
PCO2 BLDV: 43 MMHG — HIGH (ref 39–42)
PH BLDV: 7.42 — SIGNIFICANT CHANGE UP (ref 7.32–7.43)
PLAT MORPH BLD: NORMAL — SIGNIFICANT CHANGE UP
PLATELET # BLD AUTO: 218 K/UL — SIGNIFICANT CHANGE UP (ref 150–400)
PO2 BLDV: 43 MMHG — SIGNIFICANT CHANGE UP (ref 25–45)
POTASSIUM SERPL-MCNC: 4.6 MMOL/L — SIGNIFICANT CHANGE UP (ref 3.5–5.3)
POTASSIUM SERPL-SCNC: 4.6 MMOL/L — SIGNIFICANT CHANGE UP (ref 3.5–5.3)
RBC # BLD: 4.43 M/UL — SIGNIFICANT CHANGE UP (ref 3.8–5.2)
RBC # FLD: 13.1 % — SIGNIFICANT CHANGE UP (ref 10.3–14.5)
RBC BLD AUTO: NORMAL — SIGNIFICANT CHANGE UP
RSV RNA NPH QL NAA+NON-PROBE: SIGNIFICANT CHANGE UP
SAO2 % BLDV: 72.2 % — SIGNIFICANT CHANGE UP (ref 67–88)
SARS-COV-2 RNA SPEC QL NAA+PROBE: SIGNIFICANT CHANGE UP
SODIUM SERPL-SCNC: 142 MMOL/L — SIGNIFICANT CHANGE UP (ref 135–145)
SOURCE RESPIRATORY: SIGNIFICANT CHANGE UP
WBC # BLD: 8.82 K/UL — SIGNIFICANT CHANGE UP (ref 3.8–10.5)
WBC # FLD AUTO: 8.82 K/UL — SIGNIFICANT CHANGE UP (ref 3.8–10.5)

## 2025-04-28 PROCEDURE — 99223 1ST HOSP IP/OBS HIGH 75: CPT

## 2025-04-28 RX ORDER — NIFEDIPINE 30 MG
30 TABLET, EXTENDED RELEASE 24 HR ORAL DAILY
Refills: 0 | Status: DISCONTINUED | OUTPATIENT
Start: 2025-04-28 | End: 2025-05-01

## 2025-04-28 RX ORDER — ENOXAPARIN SODIUM 100 MG/ML
30 INJECTION SUBCUTANEOUS EVERY 24 HOURS
Refills: 0 | Status: DISCONTINUED | OUTPATIENT
Start: 2025-04-28 | End: 2025-05-01

## 2025-04-28 RX ORDER — LISINOPRIL 30 MG/1
25 TABLET ORAL DAILY
Refills: 0 | Status: DISCONTINUED | OUTPATIENT
Start: 2025-04-28 | End: 2025-05-01

## 2025-04-28 RX ORDER — BENZONATATE 100 MG
200 CAPSULE ORAL THREE TIMES A DAY
Refills: 0 | Status: DISCONTINUED | OUTPATIENT
Start: 2025-04-28 | End: 2025-05-02

## 2025-04-28 RX ORDER — ASPIRIN 325 MG
81 TABLET ORAL DAILY
Refills: 0 | Status: DISCONTINUED | OUTPATIENT
Start: 2025-04-28 | End: 2025-05-01

## 2025-04-28 RX ORDER — SODIUM CHLORIDE 9 G/1000ML
1000 INJECTION, SOLUTION INTRAVENOUS
Refills: 0 | Status: DISCONTINUED | OUTPATIENT
Start: 2025-04-28 | End: 2025-04-29

## 2025-04-28 RX ORDER — DEXTROMETHORPHAN HBR, GUAIFENESIN 200 MG/10ML
600 LIQUID ORAL EVERY 12 HOURS
Refills: 0 | Status: DISCONTINUED | OUTPATIENT
Start: 2025-04-28 | End: 2025-05-02

## 2025-04-28 RX ORDER — IPRATROPIUM BROMIDE AND ALBUTEROL SULFATE .5; 2.5 MG/3ML; MG/3ML
3 SOLUTION RESPIRATORY (INHALATION) EVERY 6 HOURS
Refills: 0 | Status: DISCONTINUED | OUTPATIENT
Start: 2025-04-28 | End: 2025-05-01

## 2025-04-28 RX ORDER — LOSARTAN POTASSIUM 100 MG/1
100 TABLET, FILM COATED ORAL DAILY
Refills: 0 | Status: DISCONTINUED | OUTPATIENT
Start: 2025-04-28 | End: 2025-05-02

## 2025-04-28 RX ORDER — FLUTICASONE PROPIONATE 50 UG/1
2 SPRAY, METERED NASAL
Refills: 0 | Status: DISCONTINUED | OUTPATIENT
Start: 2025-04-28 | End: 2025-05-02

## 2025-04-28 RX ORDER — RALOXIFENE HYDROCHLORIDE 60 MG/1
60 TABLET, FILM COATED ORAL DAILY
Refills: 0 | Status: DISCONTINUED | OUTPATIENT
Start: 2025-04-28 | End: 2025-05-02

## 2025-04-28 RX ORDER — PREDNISONE 20 MG/1
20 TABLET ORAL DAILY
Refills: 0 | Status: DISCONTINUED | OUTPATIENT
Start: 2025-04-28 | End: 2025-05-02

## 2025-04-28 RX ADMIN — PREDNISONE 20 MILLIGRAM(S): 20 TABLET ORAL at 08:22

## 2025-04-28 RX ADMIN — SODIUM CHLORIDE 125 MILLILITER(S): 9 INJECTION, SOLUTION INTRAVENOUS at 06:26

## 2025-04-28 RX ADMIN — Medication 40 MILLIGRAM(S): at 06:26

## 2025-04-28 RX ADMIN — LISINOPRIL 25 MILLIGRAM(S): 30 TABLET ORAL at 06:26

## 2025-04-28 RX ADMIN — Medication 1 LOZENGE: at 01:00

## 2025-04-28 RX ADMIN — IPRATROPIUM BROMIDE AND ALBUTEROL SULFATE 3 MILLILITER(S): .5; 2.5 SOLUTION RESPIRATORY (INHALATION) at 08:25

## 2025-04-28 RX ADMIN — Medication 1 LOZENGE: at 15:29

## 2025-04-28 RX ADMIN — Medication 1 LOZENGE: at 22:24

## 2025-04-28 RX ADMIN — IPRATROPIUM BROMIDE AND ALBUTEROL SULFATE 3 MILLILITER(S): .5; 2.5 SOLUTION RESPIRATORY (INHALATION) at 15:30

## 2025-04-28 RX ADMIN — FLUTICASONE PROPIONATE 2 SPRAY(S): 50 SPRAY, METERED NASAL at 17:54

## 2025-04-28 RX ADMIN — Medication 81 MILLIGRAM(S): at 11:16

## 2025-04-28 RX ADMIN — IPRATROPIUM BROMIDE AND ALBUTEROL SULFATE 3 MILLILITER(S): .5; 2.5 SOLUTION RESPIRATORY (INHALATION) at 19:46

## 2025-04-28 RX ADMIN — DEXTROMETHORPHAN HBR, GUAIFENESIN 600 MILLIGRAM(S): 200 LIQUID ORAL at 17:54

## 2025-04-28 RX ADMIN — Medication 200 MILLIGRAM(S): at 12:03

## 2025-04-28 NOTE — CARE COORDINATION ASSESSMENT. - OTHER PERTINENT REFERRAL INFORMATION
Pt. states martha Sosa [phone] is the closes in [place] and she asked  to call him.    Pt. states martha Jewell is [facility] and lives in [phone]

## 2025-04-28 NOTE — PATIENT CHOICE NOTE. - NSPTCHOICENOTES_GEN_ALL_CORE
If pt. needs home care at time of transition home she chose [facility] care as first choice from list of [facility] provideed.

## 2025-04-28 NOTE — H&P ADULT - NSHPPHYSICALEXAM_GEN_ALL_CORE
-    Vital Signs Last 24 Hrs  T(C): 36.4 (28 Apr 2025 01:04), Max: 37.1 (27 Apr 2025 20:38)  T(F): 97.5 (28 Apr 2025 01:04), Max: 98.7 (27 Apr 2025 20:38)  HR: 75 (28 Apr 2025 05:07) (75 - 97)  BP: 138/65 (28 Apr 2025 05:07) (113/57 - 138/65)  BP(mean): --  RR: 18 (28 Apr 2025 05:07) (16 - 22)  SpO2: 92% (28 Apr 2025 05:07) (87% - 95%)    Parameters below as of 28 Apr 2025 05:07  Patient On (Oxygen Delivery Method): nasal cannula  O2 Flow (L/min): 2      PHYSICAL EXAM:  		  GENERAL: NAD, well-groomed, well-developed, not in respiratory distress.  HEAD:  Atraumatic, Norm cephalic.  EYES: PERRLA, conjunctiva clear.  ENMT: no nasal discharge, (+) rosendo-pharyngeal erythema, mildly dry MM.   NECK: Supple, No JVD.  NERVOUS SYSTEM:  Alert & oriented X3, neurologically intact grossly.  CHEST/LUNG: Fair air entry B/L, (++) right basal coarse rales, no rhonchi, or wheezing.  HEART: Normal S1 & acc S2, no murmurs, or extra sounds.  ABDOMEN: Soft, non-tender, non-distended; bowel sounds present, no palpable masses or organomegaly.  EXTREMITIES:  No clubbing, cyanosis, or edema.  VASCULAR: 2+ radial, DPA / PTA pulses B/L.  SKIN: No rashes or lesions.  PSYCH: normal affect & behavior.

## 2025-04-28 NOTE — H&P ADULT - PROBLEM SELECTOR PLAN 2
when she was ambulated her SPO2 dropped to 86% on RA, ML related to the above, bronchodilators & mucolytic, supplemental oxygen, consider inhaled/systemic steroids, in addition to antibiotic therapy, pulmonary consult with Dr. Garrison was called.

## 2025-04-28 NOTE — CARE COORDINATION ASSESSMENT. - NSCAREPROVIDERS_GEN_ALL_CORE_FT
CARE PROVIDERS:  Accepting Physician: Tara Reyes  Administration: Melissa Larry  Administration: Julio Moe  Administration: Annia Go  Administration: Alfredo Damon  Admitting: Tara Reyes  Attending: Tara Reyes  Case Management: Jael Silveira  Consultant: Mirna Henning  Consultant: Jose Garrison  Covering Team: Nguyễn Flores  ED Attending: Ko Sneed  ED Nurse: Dima Womack  Infection Control: Kell Sellers  Nurse: Teresa Stokes  Nurse: Cooper Martin  Nurse: Dima Womack  Primary Team: Fab Smith  Registered Dietitian: Radha Wyatt  Respiratory Therapy: Demarcus Pederson  : María Titus  Team: [place]  Hospitalists, Team

## 2025-04-28 NOTE — CARE COORDINATION ASSESSMENT. - NSDCPLANSERVICES_GEN_ALL_CORE
Per H&P: 89 y/o F with PMH of HTN, COPD, COVID-19 in 2022, Nephrolithiasis, Osteoporosis on Raloxifene, Crohn's Disease, and GERD who presented with 5 days of flu-like symptoms, started as sore throat that rapidly got much worse, with hoarsness of her voice, then next day she started coughing with thick non bloody sputum that turned green in color, no fever, but had chills, unaware of any sick contact but stating that always people are sick in her [facility]. At the ED she intitially refused viral swab stating that she tested negative 2 days ago so ED team gave her Dexamethasone IVP empirically for possible COVID-19 infection as a cause of dropping of her SPO2, then she agreed to get the test whose result is still pending. Non contrast chest CT didn't suggest PNA, but showed evidence of emphysema with bibasilar bronchiectasis, and bilateral mainstem bronchial secretions.  mainstem bronchi.    Dx Acute bronchitis with COPD.  malaise  weakness  emphysema    ivf  cc/h, nebs and systemic steroids for COPD     Hypoxia: ambulated her SPO2 dropped to 86% on RA , LevoFLOXacin    --------------    Await PT eval  Anticipate pt. may need Home care  TBD.    Owns no DME   CM team following./Anticipated Needs Unclear at Present/Home Care

## 2025-04-28 NOTE — CARE COORDINATION ASSESSMENT. - LIVING ARRANGEMENTS, PROFILE
Independent living facility with an elevator and provided meals , local transport and cleaning / laundry services.

## 2025-04-28 NOTE — H&P ADULT - NSHPSOCIALHISTORY_GEN_ALL_CORE
-    , resides in an [facility], former smoker, quit very long time ago, no ETOH or drug abuse.

## 2025-04-28 NOTE — H&P ADULT - NSHPLABSRESULTS_GEN_ALL_CORE
-                          15.3   13.10 )-----------( 248      ( 27 Apr 2025 21:20 )             45.6       27 Apr 2025 21:20    140    |  103    |  28     ----------------------------<  160    4.4     |  28     |  0.92     Ca    9.5        27 Apr 2025 21:20    TPro  7.8    /  Alb  3.4    /  TBili  0.6    /  DBili  x      /  AST  23     /  ALT  23     /  AlkPhos  130    27 Apr 2025 21:20    LIVER FUNCTIONS - ( 27 Apr 2025 21:20 )  Alb: 3.4 g/dL / Pro: 7.8 g/dL / ALK PHOS: 130 U/L / ALT: 23 U/L / AST: 23 U/L / GGT: x           Urinalysis Basic - ( 27 Apr 2025 21:20 )  Color: x / Appearance: x / SG: x / pH: x  Gluc: 160 mg/dL / Ketone: x  / Bili: x / Urobili: x   Blood: x / Protein: x / Nitrite: x   Leuk Esterase: x / RBC: x / WBC x   Sq Epi: x / Non Sq Epi: x / Bacteria: x      FluA/FluB/RSV/COVID-19 PCR (04.28.25 @ 04:59)   SARS-CoV-2 Result: Not Detected.  Influenza A Result: Not Detected.  Influenza B Result: Not Detected.  Resp Syn Virus Result: Not Detected.  Source Respiratory: Nasopharyngeal.         CT CHEST     PROCEDURE DATE:  04/27/2025    COMPARISON: Same day chest radiograph, 6/20/2014 CT  IV Contrast: NONE  Oral Contrast: NONE  PROCEDURE:  CT of the Chest was performed.  Sagittal and coronal reformats were performed.  FINDINGS:  Evaluation of solid organs and vascular structures is limited without intravenous contrast.  LUNGS AND LARGE AIRWAYS: Secretions in the bilateral mainstem bronchi.   Emphysema. Bibasilar bronchiectasis. No consolidation. Mild bibasilar atelectasis. Subpleural reticulation in the right lower lobe possibly reflecting mildfibrotic disease. Right upper lobe calcified granuloma.   Fat-containing left Bochdalek hernia.  PLEURA: No pleural effusion.  VESSELS: Aortic calcifications. Coronary artery calcifications.  HEART: Heart size is normal. No pericardial effusion. Mitral valve annular calcifications.  MEDIASTINUM AND BENTON: No lymphadenopathy.  CHEST WALL AND LOWER NECK: Within normal limits.  VISUALIZED UPPER ABDOMEN: Diffuse cystic replacement of the pancreas better evaluated on prior imaging. Right hepatic lobe cyst. Left renal cysts. Left renal atrophy.  BONES: Degenerative changes.  IMPRESSION:  No CT evidence of pneumonia. Emphysema. Secretions in the bilateral mainstem bronchi.      CXR:    As per my review shows normal cardiac shadow size, aortic arch calcifications, hyperinflated clear lungs B/L, no pulmonary infiltrates, pleural effusion, or pneumothorax. Pending official report.       EKG:    As per my review shows SR at 95/min, normal KS & QTc intervals, normal QRS voltage, duration, and axis (+45), with normal transition, no ST-T abnormality.    -

## 2025-04-28 NOTE — CAREGIVER ENGAGEMENT NOTE - CAREGIVER EDUCATION HOME CARE SERVICES - FREE TEXT
If needed ( TBD) [facility] - [phone]   to visit within 24-72 hours after hospital discharge; physical therapist to follow. Please contact the home care agency at the above phone number if you have not heard from them by 12 noon on the day after your hospital discharge

## 2025-04-28 NOTE — H&P ADULT - ASSESSMENT
89 y/o F with PMH of HTN, COPD, COVID-19 in 2022, Nephrolithiasis, Osteoporosis on Raloxifene, Crohn's Disease, and GERD presented with flu-like symptoms.

## 2025-04-28 NOTE — CARE COORDINATION ASSESSMENT. - MET/SPOKE WITH
Pt. states martha Sosa [phone] is the closes in [place] and she asked CM to call him.    With pt. permission CM contacted martha Sosa introduced self and role of CM to assist with transition planning. Informed likely Home care services TBD with pt. chosen from [facility] care.  States either family member or [employer] drivers will transport pt. home dependent on time of d/c./patient/son

## 2025-04-28 NOTE — H&P ADULT - NSHPREVIEWOFSYSTEMS_GEN_ALL_CORE
-    CONSTITUTIONAL: No fever, (+) chills.  EYES: No eye pain, visual disturbances, or discharge.  ENMT:  No difficulty hearing, or vertigo, no sinus pain, (+) throat pain.  NECK: No pain or stiffness.	  RESPIRATORY: (+) cough & wheezing, no hemoptysis; (+) shortness of breath on walking only.  CARDIOVASCULAR: No chest pain, palpitations, dizziness, or leg swelling.  GASTROINTESTINAL: No abdominal pain, no nausea, vomiting, or hematemesis; No diarrhea or Change in bowel habits. No melena or hematochezia.  GENITOURINARY: No dysuria, frequency, hematuria, or incontinence.  NEUROLOGICAL: No headaches, focal muscle weakness, numbness, or tremors.  SKIN: No itching, burning or rashes.  MUSCULOSKELETAL: No joint swelling or pain.  PSYCHIATRIC: No depression, anxiety, or agitation.  HEME/LYMPH: No easy bruising, bleeding gums, or nose bleed.  ALLERGY AND IMMUNOLOGIC: No hives or eczema.

## 2025-04-28 NOTE — CHART NOTE - NSCHARTNOTEFT_GEN_A_CORE
chart reviewed  patient seen  agree with management  discussed with pt and son at bedside  all questions answered  new order for [id number] tessalon

## 2025-04-28 NOTE — PATIENT PROFILE ADULT - FALL HARM RISK - HARM RISK INTERVENTIONS

## 2025-04-28 NOTE — CARE COORDINATION ASSESSMENT. - NSPASTMEDSURGHISTORY_GEN_ALL_CORE_FT
PAST MEDICAL & SURGICAL HISTORY:  Osteoporosis      Hypertension      No significant past surgical history

## 2025-04-28 NOTE — CARE COORDINATION ASSESSMENT. - REASON FOR CONSULT
CM met with pt. at ED bedside, pt. A&O x 4 resting comfortably offering no complaints at this time.  CM explained role of CM and availability to assist with transition to home planning throughout  stay.   Provided CM contact information and  pt. verbalized understanding.      Pt. States Transition plan is TBD agree to Home care PT if recommended.  CM explained home care expectations, process, insurance provisions and home  safety. Offered list of [facility] and pt. chose [facility]. States her son Ian works for [employer].  Provided discharge resource folder.  CM will make referral accordingly, if needed.  Anticipate Transition Date TBD , Await a PT EVal .      DME :[place] no DME States very independent and active goes to the [employer] and exercise classes 6 x week PTA.  States she uses the elevator to 3 rd floor apt where she lives alone.    PCP is Dr. Dr. Parish [phone] - states she has appt. end o may informed she may need to change it to within a week after going home.      Pharmacy [facility]    Pt. verbalizing understanding and in agreement with Transition post acute plans.   CM to follow./coordination of care/discharge planning

## 2025-04-28 NOTE — H&P ADULT - HISTORY OF PRESENT ILLNESS
This is a 89 y/o F with PMH of HTN, COPD, COVID-19 in 2022, Nephrolithiasis, Osteoporosis on Raloxifene, Crohn's Disease, and GERD who presented with 5 days of flu-like symptoms, started as sore throat that        This is a 89 y/o F with PMH of HTN, COPD, COVID-19 in 2022, Nephrolithiasis, Osteoporosis on Raloxifene, Crohn's Disease, and GERD who presented with 5 days of flu-like symptoms, started as sore throat that rapidly got much worse, with hoarsness of her voice, then next day she started coughing with thick non bloody sputum that turned green in color, no fever, but had chills, unaware of any sick contact but stating that always people are sick in her [facility]. At the ED she intitially refused viral swab stating that she tested negative 2 days ago so ED team gave her Dexamethasone IVP empirically for possible COVID-19 infection as a cause of dropping of her SPO2, then she agreed to get the test whose result is still pending. Non contrast chest CT didn't suggest PNA, but showed evidence of emphysema with bibasilar bronchiectasis, and bilateral mainstem bronchial secretions.  mainstem bronchi.

## 2025-04-28 NOTE — H&P ADULT - PROBLEM SELECTOR PLAN 3
ML stress related in response to the acute illness, no H/O DM or glucose intolerance, recheck plasma glucose in am.

## 2025-04-28 NOTE — CAREGIVER ENGAGEMENT NOTE - CAREGIVER OUTREACH NOTES - FREE TEXT
Pt. states martha Sosa [phone] is the closes in [place] and she asked CM to call him- spoke with son.    Pt. states martha Jewell is [facility] and lives in [phone]  Daughter Socorro Tiwari lives in [facility]   [phone]

## 2025-04-28 NOTE — PATIENT PROFILE ADULT - PATIENT'S GENDER IDENTITY
Female
Principal Discharge DX:	Vaginal delivery  Goal:	post partum care and pain management  Assessment and plan of treatment:	routine vaginal delivery care, no tub baths, douching or sex for 6weeks, ambulate daily   follow up in 5-6wks with own obgyn

## 2025-04-28 NOTE — PATIENT PROFILE ADULT - AVIAN FLU SYMPTOMS
Discharge Instructions     Medications: Okay to restart your other home meds. Okay to take entyvio.    Bowel Function: Diarrhea and loose stool are normal and expected after bowel obstructions. Bowel function initially tends to be erratic (increased frequency, gas, liquid/loose consistency, seepage, urgency), but it will improve over the next several months as your body adjusts to the surgical changes.    Diet: Unless directed otherwise by your surgeon, after surgery you should do the following:  Eat several (4-6) small meals each day.  If you experience difficulty eating, add in supplemental drinks (Boost®, Ensure®, Glucerna®).  If you are having loose stools, your diet should include foods to add bulk to the stool such as applesauce, bananas, cheese, peanut butter, pasta, and potatoes.  Follow a Low Fiber Diet for six (6) weeks. Avoid particular foods including the following:  Raw vegetables, beans, corn, mushrooms, legumes, peas, potato skins, sauerkraut, stewed tomatoes, brussels sprouts  Fresh fruit, dried fruit (such as raisins or prunes), coconut, juices with pulp. (It is okay to eat fruits such as bananas, melons, canned fruits, and avocado.)  Meat with casings (such as hot dogs, kielbasa, sausage), shellfish  Nuts, popcorn, seeds, chunky peanut butter  Coarse whole grains including breads/rolls with nuts, cereals with nuts, coarse whole grains, poppy, sesame seeds    Activity: Walking is encouraged. Light aerobic activity such as climbing stairs or leisurely bike riding is acceptable but listen to your body and let pain be your guide when reintroducing activity. If it hurts, don't do it.     Driving: You should not drive a vehicle while taking narcotic pain medications.    Potential Problems:   Bowel obstruction or ileus is characterized by persistent abdominal cramps, bloating, constipation, nausea, or vomiting. If the symptoms are mild, you should restrict your dietary intake to only liquids, and avoid  solid food for 2-3 days. If the symptoms are more severe or persist beyond 24 hours, please call your surgeon's office for advice.    Frequent stools and loose stools are best managed by using bulking agents or anti-diarrheal medication. Please call your surgeon or GI doctor if diarrhea is not improving after a few weeks to discuss starting one of the medications below.  Benefiber®, Citrucel®, Fibercon®, Konsyl®, and Metamucil® are bulking agents that are available at most grocery stores and pharmacies. The medication should be mixed using one (1) teaspoon of the powder in the minimum amount of fluid required to dissolve the agent and taken 1-2 times each day. Benefiber® can be alternatively sprinkled over food 1-2 times each day.  Imodium® (loperamide) is also available without a prescription. It is most effective if taken before meals. You should not take more than eight (8) tablets (32 mg) of Imodium in a 24-hour period. Please call your surgeon's office if you start taking Imodium®.     Dehydration can commonly occur, and its symptoms or signs include dark urine, dizziness when standing, dry mouth, increased heart rate, leg cramps, and low volumes (less than 800 ml) of urine. If these occur, you should immediately call your surgeon's office. To avoid dehydration, you should do the following:  Drink a variety of fluids. Use an oral rehydration salt solution like Pedialyte, G2 Gatorade, Nuun tabs, etc. and sip the solution between meals.  Eat salty foods or add salt to your food.  Use an anti-diarrheal medication or bulking agent as previously instructed by your surgeon.     Yes

## 2025-04-28 NOTE — PATIENT PROFILE ADULT - NSPROEXTENSIONSOFSELF_GEN_A_NUR
Patient is scheduled with Dr. Philippe on 2/9/2022..    Called and informed him of appt and confirmed that time and date were good for him.     He had no further questions at this time.    none

## 2025-04-28 NOTE — H&P ADULT - PROBLEM SELECTOR PLAN 1
ML bacterial on top of viral, tested negative for Influenza A & B, RSV, and COVID-19 PCR, unable to perform full RVP as patient didn't agree to do it, CT with no PNA but suggestive of bibasilar bronchiectasis & emphysema with B/L mainstem bronchial secretion, admitted to telemetry with continuous SPO2 monitoring, started her on Levofloxacin 500 mg IVPB Q 24h, check urine for strept PNA & legionella antigens, ID consult with Dr. Henning was called.

## 2025-04-29 LAB
LEGIONELLA AG UR QL: NEGATIVE — SIGNIFICANT CHANGE UP
S PNEUM AG UR QL: NEGATIVE — SIGNIFICANT CHANGE UP

## 2025-04-29 PROCEDURE — 99233 SBSQ HOSP IP/OBS HIGH 50: CPT

## 2025-04-29 RX ORDER — ATORVASTATIN CALCIUM 80 MG/1
10 TABLET, FILM COATED ORAL AT BEDTIME
Refills: 0 | Status: DISCONTINUED | OUTPATIENT
Start: 2025-04-29 | End: 2025-05-02

## 2025-04-29 RX ORDER — MELATONIN 5 MG
5 TABLET ORAL ONCE
Refills: 0 | Status: COMPLETED | OUTPATIENT
Start: 2025-04-29 | End: 2025-04-29

## 2025-04-29 RX ADMIN — FLUTICASONE PROPIONATE 2 SPRAY(S): 50 SPRAY, METERED NASAL at 17:44

## 2025-04-29 RX ADMIN — PREDNISONE 20 MILLIGRAM(S): 20 TABLET ORAL at 06:33

## 2025-04-29 RX ADMIN — SODIUM CHLORIDE 125 MILLILITER(S): 9 INJECTION, SOLUTION INTRAVENOUS at 15:41

## 2025-04-29 RX ADMIN — DEXTROMETHORPHAN HBR, GUAIFENESIN 600 MILLIGRAM(S): 200 LIQUID ORAL at 17:43

## 2025-04-29 RX ADMIN — Medication 40 MILLIGRAM(S): at 06:33

## 2025-04-29 RX ADMIN — Medication 200 MILLIGRAM(S): at 21:45

## 2025-04-29 RX ADMIN — Medication 81 MILLIGRAM(S): at 11:13

## 2025-04-29 RX ADMIN — ATORVASTATIN CALCIUM 10 MILLIGRAM(S): 80 TABLET, FILM COATED ORAL at 21:45

## 2025-04-29 RX ADMIN — Medication 200 MILLIGRAM(S): at 14:29

## 2025-04-29 RX ADMIN — ATORVASTATIN CALCIUM 10 MILLIGRAM(S): 80 TABLET, FILM COATED ORAL at 01:15

## 2025-04-29 RX ADMIN — RALOXIFENE HYDROCHLORIDE 60 MILLIGRAM(S): 60 TABLET, FILM COATED ORAL at 11:13

## 2025-04-29 RX ADMIN — IPRATROPIUM BROMIDE AND ALBUTEROL SULFATE 3 MILLILITER(S): .5; 2.5 SOLUTION RESPIRATORY (INHALATION) at 00:30

## 2025-04-29 RX ADMIN — Medication 1 LOZENGE: at 06:33

## 2025-04-29 RX ADMIN — Medication 5 MILLIGRAM(S): at 01:36

## 2025-04-29 NOTE — PHYSICAL THERAPY INITIAL EVALUATION ADULT - PERTINENT HX OF CURRENT PROBLEM, REHAB EVAL
as per chart - This is a 89 y/o F with PMH of HTN, COPD, COVID-19 in 2022, Nephrolithiasis, Osteoporosis on Raloxifene, Crohn's Disease, and GERD who presented with 5 days of flu-like symptoms, started as sore throat that rapidly got much worse, with hoarsness of her voice, then next day she started coughing with thick non bloody sputum that turned green in color, no fever, but had chills, unaware of any sick contact but stating that always people are sick in her [place]. At the ED she intitially refused viral swab stating that she tested negative 2 days ago so ED team gave her Dexamethasone IVP empirically for possible COVID-19 infection as a cause of dropping of her SPO2, then she agreed to get the test whose result is still pending. Non contrast chest CT didn't suggest PNA, but showed evidence of emphysema with bibasilar bronchiectasis, and bilateral mainstem bronchial secretions.  mainstem bronchi.

## 2025-04-29 NOTE — PHYSICAL THERAPY INITIAL EVALUATION ADULT - ADDITIONAL COMMENTS
Pt resides in independent senior living facility, does not have family available to assist. Pt may need RW. Pt reports was independent prior to admission without use of AD, states goes to workout classes every day.

## 2025-04-30 LAB
ANION GAP SERPL CALC-SCNC: 5 MMOL/L — SIGNIFICANT CHANGE UP (ref 5–17)
BUN SERPL-MCNC: 12 MG/DL — SIGNIFICANT CHANGE UP (ref 7–23)
CALCIUM SERPL-MCNC: 9.4 MG/DL — SIGNIFICANT CHANGE UP (ref 8.4–10.5)
CHLORIDE SERPL-SCNC: 104 MMOL/L — SIGNIFICANT CHANGE UP (ref 96–108)
CO2 SERPL-SCNC: 33 MMOL/L — HIGH (ref 22–31)
CREAT SERPL-MCNC: 0.6 MG/DL — SIGNIFICANT CHANGE UP (ref 0.5–1.3)
CULTURE RESULTS: SIGNIFICANT CHANGE UP
EGFR: 84 ML/MIN/1.73M2 — SIGNIFICANT CHANGE UP
EGFR: 84 ML/MIN/1.73M2 — SIGNIFICANT CHANGE UP
GLUCOSE SERPL-MCNC: 82 MG/DL — SIGNIFICANT CHANGE UP (ref 70–99)
POTASSIUM SERPL-MCNC: 3.6 MMOL/L — SIGNIFICANT CHANGE UP (ref 3.5–5.3)
POTASSIUM SERPL-SCNC: 3.6 MMOL/L — SIGNIFICANT CHANGE UP (ref 3.5–5.3)
PROCALCITONIN SERPL-MCNC: 0.03 NG/ML — SIGNIFICANT CHANGE UP (ref 0.02–0.1)
SODIUM SERPL-SCNC: 142 MMOL/L — SIGNIFICANT CHANGE UP (ref 135–145)
SPECIMEN SOURCE: SIGNIFICANT CHANGE UP

## 2025-04-30 PROCEDURE — 99232 SBSQ HOSP IP/OBS MODERATE 35: CPT

## 2025-04-30 RX ORDER — ALBUTEROL SULFATE 2.5 MG/3ML
1 VIAL, NEBULIZER (ML) INHALATION EVERY 4 HOURS
Refills: 0 | Status: DISCONTINUED | OUTPATIENT
Start: 2025-04-30 | End: 2025-05-01

## 2025-04-30 RX ORDER — ALBUTEROL SULFATE 2.5 MG/3ML
1 VIAL, NEBULIZER (ML) INHALATION EVERY 4 HOURS
Refills: 0 | Status: COMPLETED | OUTPATIENT
Start: 2025-04-30 | End: 2026-03-29

## 2025-04-30 RX ADMIN — Medication 1 LOZENGE: at 02:49

## 2025-04-30 RX ADMIN — Medication 30 MILLIGRAM(S): at 06:01

## 2025-04-30 RX ADMIN — RALOXIFENE HYDROCHLORIDE 60 MILLIGRAM(S): 60 TABLET, FILM COATED ORAL at 11:11

## 2025-04-30 RX ADMIN — Medication 200 MILLIGRAM(S): at 13:41

## 2025-04-30 RX ADMIN — Medication 1 PUFF(S): at 20:25

## 2025-04-30 RX ADMIN — Medication 40 MILLIGRAM(S): at 06:00

## 2025-04-30 RX ADMIN — Medication 81 MILLIGRAM(S): at 11:11

## 2025-04-30 RX ADMIN — LISINOPRIL 25 MILLIGRAM(S): 30 TABLET ORAL at 05:59

## 2025-04-30 RX ADMIN — ATORVASTATIN CALCIUM 10 MILLIGRAM(S): 80 TABLET, FILM COATED ORAL at 21:40

## 2025-04-30 RX ADMIN — LOSARTAN POTASSIUM 100 MILLIGRAM(S): 100 TABLET, FILM COATED ORAL at 06:00

## 2025-04-30 RX ADMIN — PREDNISONE 20 MILLIGRAM(S): 20 TABLET ORAL at 06:01

## 2025-04-30 RX ADMIN — DEXTROMETHORPHAN HBR, GUAIFENESIN 600 MILLIGRAM(S): 200 LIQUID ORAL at 17:24

## 2025-04-30 RX ADMIN — Medication 1 PUFF(S): at 15:19

## 2025-04-30 RX ADMIN — Medication 200 MILLIGRAM(S): at 21:40

## 2025-04-30 RX ADMIN — FLUTICASONE PROPIONATE 2 SPRAY(S): 50 SPRAY, METERED NASAL at 06:03

## 2025-04-30 RX ADMIN — Medication 200 MILLIGRAM(S): at 06:00

## 2025-04-30 RX ADMIN — DEXTROMETHORPHAN HBR, GUAIFENESIN 600 MILLIGRAM(S): 200 LIQUID ORAL at 06:00

## 2025-04-30 RX ADMIN — FLUTICASONE PROPIONATE 2 SPRAY(S): 50 SPRAY, METERED NASAL at 17:24

## 2025-04-30 RX ADMIN — ENOXAPARIN SODIUM 30 MILLIGRAM(S): 100 INJECTION SUBCUTANEOUS at 13:42

## 2025-04-30 NOTE — CAREGIVER ENGAGEMENT NOTE - CAREGIVER EDUCATION - TYPES DISCUSSED
After-care skilled tasks/Discharge plan/DME/Insurance benefits/Post-acute care agency contact/Post-discharge escalation process/SNF placement process/Transportation coordination/Transportation letter provided

## 2025-04-30 NOTE — SOCIAL WORK PROGRESS NOTE - NSSWPROGRESSNOTE_GEN_ALL_CORE
Per discussion w/ inpatient interdisciplinary treatment team this AM, patient is anticipated to be medically cleared for transition to next level of care tomorrow, 05/01/2025.  met w/ patient @ bedside on [facility] to discuss potential discharge plan, @ which time patient confirmed to be understanding of and agreeable to recommendation for sub-acute rehab. She identified that she has never been to sub-acute rehab before and identified that her preferred facility choice is the [facility] in [place];  provided discharge planning resource folder (which consists of sub-acute rehab facility choice lists for [facility] and [facility]), and  also provided education regarding insurance benefits and potential need for additional facility choices should her preferred facility be unable to accept.  sent sub-acute rehab referral accordingly & will await response regarding admission decision. Of note, no pre-authorization required as patient's primary health insurance is Medicare, and patient will need 3-night hospital stay per Medicare requirement for sub-acute rehab facility placement, meaning earliest available discharge would be tomorrow, 05/01/2025, if medically cleared.  to continue to follow.

## 2025-05-01 ENCOUNTER — RESULT REVIEW (OUTPATIENT)
Age: 89
End: 2025-05-01

## 2025-05-01 PROCEDURE — 93306 TTE W/DOPPLER COMPLETE: CPT | Mod: 26

## 2025-05-01 PROCEDURE — 99233 SBSQ HOSP IP/OBS HIGH 50: CPT

## 2025-05-01 PROCEDURE — 93356 MYOCRD STRAIN IMG SPCKL TRCK: CPT

## 2025-05-01 PROCEDURE — 76376 3D RENDER W/INTRP POSTPROCES: CPT | Mod: 26

## 2025-05-01 RX ORDER — DILTIAZEM HYDROCHLORIDE 120 MG/1
10 CAPSULE, EXTENDED RELEASE ORAL ONCE
Refills: 0 | Status: COMPLETED | OUTPATIENT
Start: 2025-05-01 | End: 2025-05-01

## 2025-05-01 RX ORDER — METOPROLOL SUCCINATE 50 MG/1
5 TABLET, EXTENDED RELEASE ORAL ONCE
Refills: 0 | Status: COMPLETED | OUTPATIENT
Start: 2025-05-01 | End: 2025-05-01

## 2025-05-01 RX ORDER — APIXABAN 2.5 MG/1
2.5 TABLET, FILM COATED ORAL
Refills: 0 | Status: DISCONTINUED | OUTPATIENT
Start: 2025-05-01 | End: 2025-05-02

## 2025-05-01 RX ORDER — METOPROLOL SUCCINATE 50 MG/1
25 TABLET, EXTENDED RELEASE ORAL
Refills: 0 | Status: DISCONTINUED | OUTPATIENT
Start: 2025-05-01 | End: 2025-05-02

## 2025-05-01 RX ADMIN — IPRATROPIUM BROMIDE AND ALBUTEROL SULFATE 3 MILLILITER(S): .5; 2.5 SOLUTION RESPIRATORY (INHALATION) at 07:15

## 2025-05-01 RX ADMIN — Medication 200 MILLIGRAM(S): at 13:29

## 2025-05-01 RX ADMIN — METOPROLOL SUCCINATE 25 MILLIGRAM(S): 50 TABLET, EXTENDED RELEASE ORAL at 08:56

## 2025-05-01 RX ADMIN — ATORVASTATIN CALCIUM 10 MILLIGRAM(S): 80 TABLET, FILM COATED ORAL at 21:45

## 2025-05-01 RX ADMIN — Medication 200 MILLIGRAM(S): at 05:47

## 2025-05-01 RX ADMIN — Medication 40 MILLIGRAM(S): at 05:48

## 2025-05-01 RX ADMIN — APIXABAN 2.5 MILLIGRAM(S): 2.5 TABLET, FILM COATED ORAL at 17:51

## 2025-05-01 RX ADMIN — Medication 1 PUFF(S): at 13:30

## 2025-05-01 RX ADMIN — DEXTROMETHORPHAN HBR, GUAIFENESIN 600 MILLIGRAM(S): 200 LIQUID ORAL at 17:50

## 2025-05-01 RX ADMIN — METOPROLOL SUCCINATE 25 MILLIGRAM(S): 50 TABLET, EXTENDED RELEASE ORAL at 20:20

## 2025-05-01 RX ADMIN — FLUTICASONE PROPIONATE 2 SPRAY(S): 50 SPRAY, METERED NASAL at 17:50

## 2025-05-01 RX ADMIN — Medication 200 MILLIGRAM(S): at 21:46

## 2025-05-01 RX ADMIN — METOPROLOL SUCCINATE 5 MILLIGRAM(S): 50 TABLET, EXTENDED RELEASE ORAL at 08:15

## 2025-05-01 RX ADMIN — PREDNISONE 20 MILLIGRAM(S): 20 TABLET ORAL at 05:48

## 2025-05-01 RX ADMIN — DILTIAZEM HYDROCHLORIDE 10 MILLIGRAM(S): 120 CAPSULE, EXTENDED RELEASE ORAL at 08:56

## 2025-05-01 RX ADMIN — Medication 30 MILLIGRAM(S): at 05:47

## 2025-05-01 RX ADMIN — FLUTICASONE PROPIONATE 2 SPRAY(S): 50 SPRAY, METERED NASAL at 05:54

## 2025-05-01 RX ADMIN — RALOXIFENE HYDROCHLORIDE 60 MILLIGRAM(S): 60 TABLET, FILM COATED ORAL at 13:29

## 2025-05-01 RX ADMIN — APIXABAN 2.5 MILLIGRAM(S): 2.5 TABLET, FILM COATED ORAL at 08:22

## 2025-05-01 RX ADMIN — DEXTROMETHORPHAN HBR, GUAIFENESIN 600 MILLIGRAM(S): 200 LIQUID ORAL at 05:47

## 2025-05-01 RX ADMIN — LISINOPRIL 25 MILLIGRAM(S): 30 TABLET ORAL at 05:48

## 2025-05-01 NOTE — CONSULT NOTE ADULT - SUBJECTIVE AND OBJECTIVE BOX
HPI:  89YO F PMH of HTN, COPD, COVID-19 in 2022, Nephrolithiasis, Osteoporosis on Raloxifene, Crohn's Disease, and GERD who presented with 5 days of flu-like symptoms, started as sore throat that rapidly got much worse, with hoarsness of her voice, then next day she started coughing with thick non bloody sputum that turned green in color, no fever, but had chills. In ED afebrile wbc wnl RVP neg CT chest neg for pna. Noted Emphysema. Secretions in the bilateral mainstem bronchi.    Infectious Disease consult was called to evaluate pt and for antibiotic management.      Past Medical & Surgical Hx:  PAST MEDICAL & SURGICAL HISTORY:  Hypertension  Osteoporosis    Social History--  EtOH: denies   Tobacco: denies  Drug Use: denies     FAMILY HISTORY:  Noncontributory    Allergies  penicillins (Anaphylaxis)  epinephrine (Anaphylaxis; Hypotension; Short breath)    Intolerances  NONE      Home Medications:  aspirin 81 mg oral tablet: 1 tab(s) orally once a day (20 Jun 2014 17:12)  atenolol 25 mg oral tablet: 1 tab(s) orally once a day (20 Jun 2014 17:12)  Cozaar 50 mg oral tablet: 1 tab(s) orally once a day (20 Jun 2014 17:12)  Evista 60 mg oral tablet: 1 tab(s) orally once a day (20 Jun 2014 17:12)  Flonase 50 mcg/inh nasal spray: 2 spray(s) nasal once a day (21 Jun 2014 16:27)  Prilosec OTC 20 mg oral delayed release tablet: 1 tab(s) orally once a day (21 Jun 2014 16:27)  Procardia 30 mg oral tablet, extended release: 1 tab(s) orally once a day (20 Jun 2014 17:12)      Current Inpatient Medications :    ANTIBIOTICS:   levoFLOXacin  Tablet 500 milliGRAM(s) Oral every 24 hours      OTHER RELEVANT MEDICATIONS :  albuterol/ipratropium for Nebulization 3 milliLiter(s) Nebulizer every 6 hours  aspirin enteric coated 81 milliGRAM(s) Oral daily  atenolol  Tablet 25 milliGRAM(s) Oral daily  benzocaine/menthol Lozenge 1 Lozenge Oral every 3 hours PRN  benzonatate 200 milliGRAM(s) Oral three times a day  enoxaparin Injectable 30 milliGRAM(s) SubCutaneous every 24 hours  fluticasone propionate 50 MICROgram(s)/spray Nasal Spray 2 Spray(s) Both Nostrils two times a day  guaiFENesin  milliGRAM(s) Oral every 12 hours  lactated ringers. 1000 milliLiter(s) IV Continuous <Continuous>  losartan 100 milliGRAM(s) Oral daily  NIFEdipine XL 30 milliGRAM(s) Oral daily  pantoprazole    Tablet 40 milliGRAM(s) Oral before breakfast  predniSONE   Tablet 20 milliGRAM(s) Oral daily    ROS:  CONSTITUTIONAL:  Negative fever + chills  EYES:  Negative  blurry vision or double vision  CARDIOVASCULAR:  Negative for chest pain or palpitations  RESPIRATORY:  + cough, wheezing, or SOB sore throat  GASTROINTESTINAL:  Negative for nausea, vomiting, diarrhea, constipation, or abdominal pain  GENITOURINARY:  Negative frequency, urgency , dysuria or hematuria   NEUROLOGIC:  No headache, confusion, dizziness, lightheadedness  All other systems were reviewed and are negative      I&O's Detail    27 Apr 2025 07:01  -  28 Apr 2025 07:00  --------------------------------------------------------  IN:    Sodium Chloride 0.9% Bolus: 500 mL  Total IN: 500 mL    OUT:  Total OUT: 0 mL    Total NET: 500 mL        Physical Exam:  Vital Signs Last 24 Hrs  T(C): 36.6 (28 Apr 2025 18:32), Max: 37.1 (27 Apr 2025 20:38)  T(F): 97.8 (28 Apr 2025 18:32), Max: 98.7 (27 Apr 2025 20:38)  HR: 78 (28 Apr 2025 18:32) (72 - 97)  BP: 116/52 (28 Apr 2025 18:32) (112/50 - 138/65)  RR: 18 (28 Apr 2025 18:32) (16 - 22)  SpO2: 93% (28 Apr 2025 18:32) (87% - 97%)    Parameters below as of 28 Apr 2025 18:32  Patient On (Oxygen Delivery Method): room air      Height (cm): 149.9 (04-27 @ 20:38)  Weight (kg): 57 (04-27 @ 20:38)  BMI (kg/m2): 25.4 (04-27 @ 20:38)  BSA (m2): 1.51 (04-27 @ 20:38)    General: well developed well nourished, in no acute distress  Neck: supple, trachea midline  Lungs: clear, no wheeze/rhonchi  Cardiovascular: regular rate and rhythm, S1 S2  Abdomen: soft, nontender, ND, bowel sounds normal  Neurological:  alert and oriented x3  Skin: no rash  Extremities: no edema    Labs:                         13.2   8.82  )-----------( 218      ( 28 Apr 2025 07:30 )             40.6   04-28    142  |  108  |  21  ----------------------------<  150[H]  4.6   |  27  |  0.73    Ca    8.9      28 Apr 2025 07:30    TPro  7.8  /  Alb  3.4  /  TBili  0.6  /  DBili  x   /  AST  23  /  ALT  23  /  AlkPhos  130[H]  04-27      RECENT CULTURES:          RADIOLOGY & ADDITIONAL STUDIES:    ACC: [id number] EXAM:  CT CHEST   ORDERED BY: CELSO LEI     PROCEDURE DATE:  04/27/2025          INTERPRETATION:  CLINICAL INFORMATION: Cough, congestion, possible   consolidation on x-ray.    COMPARISON: Same day chest radiograph, 6/20/2014 CT    CONTRAST/COMPLICATIONS:  IV Contrast: NONE  Oral Contrast: NONE  .    PROCEDURE:  CT of the Chest was performed.  Sagittal and coronal reformats were performed.    FINDINGS:  Evaluation of solid organs and vascular structures is limited without   intravenous contrast.  LUNGS AND LARGE AIRWAYS: Secretions in the bilateral mainstem bronchi.   Emphysema. Bibasilar bronchiectasis. No consolidation. Mild bibasilar   atelectasis. Subpleural reticulation in the right lower lobe possibly   reflecting mildfibrotic disease. Right upper lobe calcified granuloma.   Fat-containing left Bochdalek hernia.  PLEURA: No pleural effusion.  VESSELS: Aortic calcifications. Coronary artery calcifications.  HEART: Heart size is normal. No pericardial effusion. Mitral valve   annular calcifications.  MEDIASTINUM AND BENTON: No lymphadenopathy.  CHEST WALL AND LOWER NECK: Within normal limits.  VISUALIZED UPPER ABDOMEN: Diffuse cystic replacement of the pancreas   better evaluated on prior imaging. Right hepatic lobe cyst. Left renal   cysts. Left renal atrophy.  BONES: Degenerative changes.    IMPRESSION:  No CT evidence of pneumonia. Emphysema. Secretions in the bilateral   mainstem bronchi.    Assessment :   89YO F PMH of HTN, COPD, COVID-19 in 2022, Nephrolithiasis, Osteoporosis on Raloxifene, Crohn's Disease, and GERD who presented with 5 days of flu-like symptoms, started as sore throat that rapidly got much worse, with hoarsness of her voice, then next day she started coughing with thick non bloody sputum that turned green in color, no fever, but had chills. In ED afebrile wbc 13K  RVP neg CT chest neg for pna. Noted Emphysema. Secretions in the bilateral mainstem bronchi.  Likely viral acute bronchitis with poss bacterial component   Copd exacerbation  Acute pharyngitis  Leukocytosis       Plan :   On Empiric Levaquin  Fu cultures  Throat culture  Steroid nebs per primary team  Trend temps and cbc  Pulm toileting  Asp precautions      Advance Directives- Full code  Current Medications are documented.   Drug-drug interactions reviewed.    Continue with present regiment .  Approptiate use of antibiotics and adverse effects reviewed.      I have discussed the above plan of care with patient/son in detail. They expressed understanding of the treatment plan . Risks, benefits and alternatives discussed in detail. I have asked if they have any questions or concerns and appropriately addressed them to the best of my ability .      > 45 minutes spent in direct patient care reviewing  the notes, lab data/ imaging , discussion with multidisciplinary team. All questions were addressed and answered to the best of my capacity .    Thank you for allowing me to participate in the care of your patient .      Mirna Henning MD  Infectious Disease  [phone]    Individualized infection control protocols for an individual patient based on their diagnosis and risks in order to reduce risk of disease transmission followed. Coordinating with  infection prevention and control team members to enable healthcare facility staff to safely care for patient.  Managing infection prevention and treatment protocols associated with transitions of care for complex patients.  In-depth patient chart review that entails going back farther in time and assessing the complete breadth of all health care interactions, with higher-level synthesis for complex diagnoses.  Engaging in complex medical decision-making associated with antimicrobial prescribing including considerations such as antimicrobial resistance patterns, emergence of new variants/strains, recent antibiotic exposure, interactions/complications from comorbidities including concurrent infections, public health considerations to minimize development of antimicrobial resistance, and emerging and re-emerging infections.         
Date/Time Patient Seen:  		  Referring MD:   Data Reviewed	       Patient is a 89y old  Female who presents with a chief complaint of Flu-like symptoms. (28 Apr 2025 04:34)      Subjective/HPI   ness:  Reason for Admission: Flu-like symptoms.  History of Present Illness:   This is a 89 y/o F with PMH of HTN, COPD, COVID-19 in 2022, Nephrolithiasis, Osteoporosis on Raloxifene, Crohn's Disease, and GERD who presented with 5 days of flu-like symptoms, started as sore throat that rapidly got much worse, with hoarsness of her voice, then next day she started coughing with thick non bloody sputum that turned green in color, no fever, but had chills, unaware of any sick contact but stating that always people are sick in her [place]. At the ED she intitially refused viral swab stating that she tested negative 2 days ago so ED team gave her Dexamethasone IVP empirically for possible COVID-19 infection as a cause of dropping of her SPO2, then she agreed to get the test whose result is still pending. Non contrast chest CT didn't suggest PNA, but showed evidence of emphysema with bibasilar bronchiectasis, and bilateral mainstem bronchial secretions.  mainstem bronchi.       Review of Systems:  Review of Systems: -    CONSTITUTIONAL: No fever, (+) chills.  EYES: No eye pain, visual disturbances, or discharge.  ENMT:  No difficulty hearing, or vertigo, no sinus pain, (+) throat pain.  NECK: No pain or stiffness.	  RESPIRATORY: (+) cough & wheezing, no hemoptysis; (+) shortness of breath on walking only.  CARDIOVASCULAR: No chest pain, palpitations, dizziness, or leg swelling.  GASTROINTESTINAL: No abdominal pain, no nausea, vomiting, or hematemesis; No diarrhea or Change in bowel habits. No melena or hematochezia.  GENITOURINARY: No dysuria, frequency, hematuria, or incontinence.  NEUROLOGICAL: No headaches, focal muscle weakness, numbness, or tremors.  SKIN: No itching, burning or rashes.  MUSCULOSKELETAL: No joint swelling or pain.  PSYCHIATRIC: No depression, anxiety, or agitation.  HEME/LYMPH: No easy bruising, bleeding gums, or nose bleed.  ALLERGY AND IMMUNOLOGIC: No hives or eczema.      Allergies and Intolerances:        Allergies:  	epinephrine: Drug, Anaphylaxis, Hypotension, Short breath  	penicillins: Drug Category, Anaphylaxis    Home Medications:   * Patient Currently Takes Medications as of 20-Jun-2014 05:51 documented in Structured Notes  · 	atenolol 25 mg oral tablet: 1 tab(s) orally once a day  · 	Procardia 30 mg oral tablet, extended release: 1 tab(s) orally once a day  · 	aspirin 81 mg oral tablet: 1 tab(s) orally once a day  · 	Prilosec OTC 20 mg oral delayed release tablet: 1 tab(s) orally once a day  · 	Cozaar 50 mg oral tablet: 1 tab(s) orally once a day  · 	Evista 60 mg oral tablet: 1 tab(s) orally once a day  · 	Flonase 50 mcg/inh nasal spray: 2 spray(s) nasal once a day    .    Patient History:    Past Medical, Past Surgical, and Family History:  PAST MEDICAL HISTORY:  Hypertension     Osteoporosis.     PAST SURGICAL HISTORY:  No significant past surgical history.     Social History:  · Substance use	No   · Social History (marital status, living situation, occupation, and sexual history)	-    , resides in an [facility], former smoker, quit very long time ago, no ETOH or drug abuse.     Tobacco Screening:  · Core Measure Site	No  · Has the patient used tobacco in the past 30 days?	No     Risk Assessment:    Present on Admission:  Deep Venous Thrombosis	no   Pulmonary Embolus	no   Urinary Catheter	no   Central Venous Catheter/PICC Line	no   Surgical Site Incision	no   Pressure Ulcer(s)	no      HIV Screening:  · In accordance with [place] State law, we offer every patient who comes to our ED an HIV test. Would you like to be tested today?	Opt out      Hepatitis C Test Questions:  · In accordance with [place] State Law, we offer every patient a Hepatitis C test. Would you like to be tested today?	Opt out  	    PAST MEDICAL & SURGICAL HISTORY:  Hypertension    Osteoporosis    No significant past surgical history          Medication list         MEDICATIONS  (STANDING):  albuterol/ipratropium for Nebulization 3 milliLiter(s) Nebulizer every 6 hours  aspirin enteric coated 81 milliGRAM(s) Oral daily  atenolol  Tablet 25 milliGRAM(s) Oral daily  enoxaparin Injectable 30 milliGRAM(s) SubCutaneous every 24 hours  fluticasone propionate 50 MICROgram(s)/spray Nasal Spray 2 Spray(s) Both Nostrils two times a day  lactated ringers. 1000 milliLiter(s) (125 mL/Hr) IV Continuous <Continuous>  levoFLOXacin  Tablet 500 milliGRAM(s) Oral every 24 hours  losartan 100 milliGRAM(s) Oral daily  NIFEdipine XL 30 milliGRAM(s) Oral daily  pantoprazole    Tablet 40 milliGRAM(s) Oral before breakfast  raloxifene 60 milliGRAM(s) Oral daily    MEDICATIONS  (PRN):  benzocaine/menthol Lozenge 1 Lozenge Oral every 3 hours PRN Sore Throat         Vitals log        ICU Vital Signs Last 24 Hrs  T(C): 36.4 (28 Apr 2025 01:04), Max: 37.1 (27 Apr 2025 20:38)  T(F): 97.5 (28 Apr 2025 01:04), Max: 98.7 (27 Apr 2025 20:38)  HR: 75 (28 Apr 2025 05:07) (75 - 97)  BP: 138/65 (28 Apr 2025 05:07) (113/57 - 138/65)  BP(mean): --  ABP: --  ABP(mean): --  RR: 18 (28 Apr 2025 05:07) (16 - 22)  SpO2: 92% (28 Apr 2025 05:07) (87% - 95%)    O2 Parameters below as of 28 Apr 2025 05:07  Patient On (Oxygen Delivery Method): nasal cannula  O2 Flow (L/min): 2               Input and Output:  I&O's Detail    27 Apr 2025 07:01  -  28 Apr 2025 06:49  --------------------------------------------------------  IN:    Sodium Chloride 0.9% Bolus: 500 mL  Total IN: 500 mL    OUT:  Total OUT: 0 mL    Total NET: 500 mL          Lab Data                        15.3   13.10 )-----------( 248      ( 27 Apr 2025 21:20 )             45.6     04-27    140  |  103  |  28[H]  ----------------------------<  160[H]  4.4   |  28  |  0.92    Ca    9.5      27 Apr 2025 21:20    TPro  7.8  /  Alb  3.4  /  TBili  0.6  /  DBili  x   /  AST  23  /  ALT  23  /  AlkPhos  130[H]  04-27            Review of Systems	  flu like sx  malaise  weakness      Objective     Physical Examination    heart s1s2  lung dc bs  head nc  head at  abd soft  weak  frail      Pertinent Lab findings & Imaging      Belle:  NO   Adequate UO     I&O's Detail    27 Apr 2025 07:01  -  28 Apr 2025 06:49  --------------------------------------------------------  IN:    Sodium Chloride 0.9% Bolus: 500 mL  Total IN: 500 mL    OUT:  Total OUT: 0 mL    Total NET: 500 mL               Discussed with:     Cultures:	        Radiology    ACC: [id number] EXAM:  CT CHEST   ORDERED BY: CELSO LEI     PROCEDURE DATE:  04/27/2025          INTERPRETATION:  CLINICAL INFORMATION: Cough, congestion, possible   consolidation on x-ray.    COMPARISON: Same day chest radiograph, 6/20/2014 CT    CONTRAST/COMPLICATIONS:  IV Contrast: NONE  Oral Contrast: NONE  .    PROCEDURE:  CT of the Chest was performed.  Sagittal and coronal reformats were performed.    FINDINGS:  Evaluation of solid organs and vascular structures is limited without   intravenous contrast.  LUNGS AND LARGE AIRWAYS: Secretions in the bilateral mainstem bronchi.   Emphysema. Bibasilar bronchiectasis. No consolidation. Mild bibasilar   atelectasis. Subpleural reticulation in the right lower lobe possibly   reflecting mild fibrotic disease. Right upper lobe calcified granuloma.   Fat-containing left Bochdalek hernia.  PLEURA: No pleural effusion.  VESSELS: Aortic calcifications. Coronary artery calcifications.  HEART: Heart size is normal. No pericardial effusion. Mitral valve   annular calcifications.  MEDIASTINUM AND BENTON: No lymphadenopathy.  CHEST WALL AND LOWER NECK: Within normal limits.  VISUALIZED UPPER ABDOMEN: Diffuse cystic replacement of the pancreas   better evaluated on prior imaging. Right hepatic lobe cyst. Left renal   cysts. Left renal atrophy.  BONES: Degenerative changes.    IMPRESSION:  No CT evidence of pneumonia. Emphysema. Secretions in the bilateral   mainstem bronchi.        --- End of Report ---            DANY MARTIN MD; Attending Radiologist  This document has been electronically signed. Apr 27 2025 11:41PM                          
History of Present Illness: The patient is a 89 year old female with a history of HTN, COPD, Crohn's disease who presented initially with sore throat, cough, shortness of breath. She was wheezing previously. No chest pain or shortness of breath. Today she was noted to go into rapid atrial fibrillation. She denies palpitations or dizziness.    Past Medical/Surgical History:  HTN, COPD, Crohn's disease    Medications:  Home Medications:  aspirin 81 mg oral tablet: 1 tab(s) orally once a day (20 Jun 2014 17:12)  atenolol 25 mg oral tablet: 1 tab(s) orally once a day (20 Jun 2014 17:12)  Cozaar 50 mg oral tablet: 1 tab(s) orally once a day (20 Jun 2014 17:12)  Evista 60 mg oral tablet: 1 tab(s) orally once a day (20 Jun 2014 17:12)  Flonase 50 mcg/inh nasal spray: 2 spray(s) nasal once a day (21 Jun 2014 16:27)  Prilosec OTC 20 mg oral delayed release tablet: 1 tab(s) orally once a day (21 Jun 2014 16:27)  Procardia 30 mg oral tablet, extended release: 1 tab(s) orally once a day (20 Jun 2014 17:12)      Family History: Non-contributory family history of premature cardiovascular atherosclerotic disease    Social History: No tobacco, alcohol or drug use    Review of Systems:  General: No fevers, chills, weight gain  Skin: No rashes, color changes  Cardiovascular: No chest pain, orthopnea  Respiratory: No shortness of breath, cough  Gastrointestinal: No nausea, abdominal pain  Genitourinary: No incontinence, pain with urination  Musculoskeletal: No pain, swelling, decreased range of motion  Neurological: No headache, weakness  Psychiatric: No depression, anxiety  Endocrine: No weight gain, increased thirst  All other systems are comprehensively negative.    Physical Exam:  Vitals:        Vital Signs Last 24 Hrs  T(C): 36.5 (01 May 2025 05:10), Max: 36.6 (30 Apr 2025 16:24)  T(F): 97.7 (01 May 2025 05:10), Max: 97.8 (30 Apr 2025 16:24)  HR: 121 (01 May 2025 07:48) (67 - 147)  BP: 127/86 (01 May 2025 08:25) (109/58 - 136/67)  BP(mean): --  RR: 18 (01 May 2025 08:25) (18 - 18)  SpO2: 94% (01 May 2025 08:25) (94% - 98%)  Parameters below as of 01 May 2025 07:48  Patient On (Oxygen Delivery Method): nasal cannula  General: NAD  HEENT: MMM  Neck: No JVD, no carotid bruit  Lungs: CTAB  CV: Tachycardic, nl S1/S2, no M/R/G  Abdomen: S/NT/ND, +BS  Extremities: No LE edema, no cyanosis  Neuro: AAOx3, non-focal  Skin: No rash    Labs:    04-30    142  |  104  |  12  ----------------------------<  82  3.6   |  33[H]  |  0.60    Ca    9.4      30 Apr 2025 08:09              ECG/Telemetry: NSR, normal axis, nonspecific ST abnormality

## 2025-05-01 NOTE — CONSULT NOTE ADULT - ASSESSMENT
The patient is a 89 year old female with a history of HTN, COPD, Crohn's disease who presented initially with sore throat, cough, shortness of breath in the setting of presumed viral infection, COPD exacerbation.    Plan:  - ECG with sinus rhythm and nonspecific findings  - Repeat ECG  - Check echo  - Telemetry with atrial fibrillation in the 140-150 heart rate range  - CT chest with emphysema  - Give metoprolol tartrate 5 mg IV  - Transition from atenolol to metoprolol tartrate 25 mg bid and titrate up as needed  - Start apixaban 2.5 mg bid  - Discontinue aspirin  - Continue losartan 100 mg daily  - Hold nifedipine for now
89 y/o F with PMH of HTN, COPD, COVID-19 in 2022, Nephrolithiasis, Osteoporosis on Raloxifene, Crohn's Disease, and GERD who presented with 5 days of flu-like symptoms, started as sore throat that rapidly got much worse, with hoarsness of her voice, then next day she started coughing with thick non bloody sputum that turned green in color    bronchitis  malaise  weakness  emphysema  copd ex  HTN  OP  OA  IBD  GERD    emp ABX   cx -   trend WBC  no evidence of consolidation on CT chest - secretions - emphysema -   nebs and systemic steroids for COPD  I kizzy  [facility]vs rx regimen  BP control  cough rx regimen  nutrition  hydration  dvt p  oral hygiene  skin care  PPI  CT chest reviewed

## 2025-05-01 NOTE — SOCIAL WORK PROGRESS NOTE - NSSWPROGRESSNOTE_GEN_ALL_CORE
Per discussion w/ inpatient interdisciplinary treatment team this AM, patient is not yet medically cleared for transition to next level of care, however anticipated discharge plan remains for sub-acute rehab. Per review of sub-acute rehab referral, patient has been accepted to her preferred facility choice, the [facility] in [place], pending bed availability on date of discharge.  to continue to follow patient's care & progress throughout hospitalization and will arrange after-care accordingly.

## 2025-05-02 ENCOUNTER — TRANSCRIPTION ENCOUNTER (OUTPATIENT)
Age: 89
End: 2025-05-02

## 2025-05-02 VITALS
SYSTOLIC BLOOD PRESSURE: 128 MMHG | TEMPERATURE: 98 F | OXYGEN SATURATION: 95 % | DIASTOLIC BLOOD PRESSURE: 70 MMHG | RESPIRATION RATE: 18 BRPM | HEART RATE: 81 BPM

## 2025-05-02 LAB
ANION GAP SERPL CALC-SCNC: 6 MMOL/L — SIGNIFICANT CHANGE UP (ref 5–17)
BUN SERPL-MCNC: 20 MG/DL — SIGNIFICANT CHANGE UP (ref 7–23)
CALCIUM SERPL-MCNC: 9.2 MG/DL — SIGNIFICANT CHANGE UP (ref 8.4–10.5)
CHLORIDE SERPL-SCNC: 104 MMOL/L — SIGNIFICANT CHANGE UP (ref 96–108)
CO2 SERPL-SCNC: 35 MMOL/L — HIGH (ref 22–31)
CREAT SERPL-MCNC: 0.64 MG/DL — SIGNIFICANT CHANGE UP (ref 0.5–1.3)
EGFR: 83 ML/MIN/1.73M2 — SIGNIFICANT CHANGE UP
EGFR: 83 ML/MIN/1.73M2 — SIGNIFICANT CHANGE UP
GLUCOSE SERPL-MCNC: 81 MG/DL — SIGNIFICANT CHANGE UP (ref 70–99)
HCT VFR BLD CALC: 47.5 % — HIGH (ref 34.5–45)
HGB BLD-MCNC: 15.1 G/DL — SIGNIFICANT CHANGE UP (ref 11.5–15.5)
MCHC RBC-ENTMCNC: 29.3 PG — SIGNIFICANT CHANGE UP (ref 27–34)
MCHC RBC-ENTMCNC: 31.8 G/DL — LOW (ref 32–36)
MCV RBC AUTO: 92.2 FL — SIGNIFICANT CHANGE UP (ref 80–100)
NRBC BLD AUTO-RTO: 0 /100 WBCS — SIGNIFICANT CHANGE UP (ref 0–0)
PLATELET # BLD AUTO: 271 K/UL — SIGNIFICANT CHANGE UP (ref 150–400)
POTASSIUM SERPL-MCNC: 4 MMOL/L — SIGNIFICANT CHANGE UP (ref 3.5–5.3)
POTASSIUM SERPL-SCNC: 4 MMOL/L — SIGNIFICANT CHANGE UP (ref 3.5–5.3)
RBC # BLD: 5.15 M/UL — SIGNIFICANT CHANGE UP (ref 3.8–5.2)
RBC # FLD: 13 % — SIGNIFICANT CHANGE UP (ref 10.3–14.5)
SODIUM SERPL-SCNC: 145 MMOL/L — SIGNIFICANT CHANGE UP (ref 135–145)
WBC # BLD: 11.92 K/UL — HIGH (ref 3.8–10.5)
WBC # FLD AUTO: 11.92 K/UL — HIGH (ref 3.8–10.5)

## 2025-05-02 PROCEDURE — 97116 GAIT TRAINING THERAPY: CPT

## 2025-05-02 PROCEDURE — 80048 BASIC METABOLIC PNL TOTAL CA: CPT

## 2025-05-02 PROCEDURE — 87081 CULTURE SCREEN ONLY: CPT

## 2025-05-02 PROCEDURE — 84145 PROCALCITONIN (PCT): CPT

## 2025-05-02 PROCEDURE — 97110 THERAPEUTIC EXERCISES: CPT

## 2025-05-02 PROCEDURE — 96361 HYDRATE IV INFUSION ADD-ON: CPT

## 2025-05-02 PROCEDURE — 71045 X-RAY EXAM CHEST 1 VIEW: CPT

## 2025-05-02 PROCEDURE — 80053 COMPREHEN METABOLIC PANEL: CPT

## 2025-05-02 PROCEDURE — 85027 COMPLETE CBC AUTOMATED: CPT

## 2025-05-02 PROCEDURE — 99239 HOSP IP/OBS DSCHRG MGMT >30: CPT

## 2025-05-02 PROCEDURE — 36415 COLL VENOUS BLD VENIPUNCTURE: CPT

## 2025-05-02 PROCEDURE — 93306 TTE W/DOPPLER COMPLETE: CPT

## 2025-05-02 PROCEDURE — 94640 AIRWAY INHALATION TREATMENT: CPT

## 2025-05-02 PROCEDURE — 82803 BLOOD GASES ANY COMBINATION: CPT

## 2025-05-02 PROCEDURE — 93005 ELECTROCARDIOGRAM TRACING: CPT

## 2025-05-02 PROCEDURE — 96374 THER/PROPH/DIAG INJ IV PUSH: CPT

## 2025-05-02 PROCEDURE — 97161 PT EVAL LOW COMPLEX 20 MIN: CPT

## 2025-05-02 PROCEDURE — 93356 MYOCRD STRAIN IMG SPCKL TRCK: CPT

## 2025-05-02 PROCEDURE — 87899 AGENT NOS ASSAY W/OPTIC: CPT

## 2025-05-02 PROCEDURE — 86140 C-REACTIVE PROTEIN: CPT

## 2025-05-02 PROCEDURE — 85025 COMPLETE CBC W/AUTO DIFF WBC: CPT

## 2025-05-02 PROCEDURE — 99285 EMERGENCY DEPT VISIT HI MDM: CPT

## 2025-05-02 PROCEDURE — 97530 THERAPEUTIC ACTIVITIES: CPT

## 2025-05-02 PROCEDURE — 71250 CT THORAX DX C-: CPT | Mod: MC

## 2025-05-02 PROCEDURE — 87637 SARSCOV2&INF A&B&RSV AMP PRB: CPT

## 2025-05-02 PROCEDURE — 76376 3D RENDER W/INTRP POSTPROCES: CPT

## 2025-05-02 PROCEDURE — 93010 ELECTROCARDIOGRAM REPORT: CPT

## 2025-05-02 RX ORDER — PREDNISONE 20 MG/1
1 TABLET ORAL
Qty: 0 | Refills: 0 | DISCHARGE
Start: 2025-05-02

## 2025-05-02 RX ORDER — SENNA 187 MG
1 TABLET ORAL
Qty: 0 | Refills: 0 | DISCHARGE
Start: 2025-05-02

## 2025-05-02 RX ORDER — SENNA 187 MG
1 TABLET ORAL DAILY
Refills: 0 | Status: DISCONTINUED | OUTPATIENT
Start: 2025-05-02 | End: 2025-05-02

## 2025-05-02 RX ORDER — POLYETHYLENE GLYCOL 3350 17 G/17G
17 POWDER, FOR SOLUTION ORAL
Qty: 0 | Refills: 0 | DISCHARGE
Start: 2025-05-02

## 2025-05-02 RX ORDER — APIXABAN 2.5 MG/1
1 TABLET, FILM COATED ORAL
Qty: 0 | Refills: 0 | DISCHARGE
Start: 2025-05-02

## 2025-05-02 RX ORDER — DEXTROMETHORPHAN HBR, GUAIFENESIN 200 MG/10ML
1 LIQUID ORAL
Qty: 0 | Refills: 0 | DISCHARGE
Start: 2025-05-02

## 2025-05-02 RX ORDER — METOPROLOL SUCCINATE 50 MG/1
1 TABLET, EXTENDED RELEASE ORAL
Qty: 0 | Refills: 0 | DISCHARGE
Start: 2025-05-02

## 2025-05-02 RX ORDER — POLYETHYLENE GLYCOL 3350 17 G/17G
17 POWDER, FOR SOLUTION ORAL DAILY
Refills: 0 | Status: DISCONTINUED | OUTPATIENT
Start: 2025-05-02 | End: 2025-05-02

## 2025-05-02 RX ORDER — ATORVASTATIN CALCIUM 80 MG/1
1 TABLET, FILM COATED ORAL
Qty: 0 | Refills: 0 | DISCHARGE
Start: 2025-05-02

## 2025-05-02 RX ADMIN — Medication 40 MILLIGRAM(S): at 05:57

## 2025-05-02 RX ADMIN — FLUTICASONE PROPIONATE 2 SPRAY(S): 50 SPRAY, METERED NASAL at 05:58

## 2025-05-02 RX ADMIN — Medication 1 PUFF(S): at 00:28

## 2025-05-02 RX ADMIN — METOPROLOL SUCCINATE 25 MILLIGRAM(S): 50 TABLET, EXTENDED RELEASE ORAL at 05:58

## 2025-05-02 RX ADMIN — DEXTROMETHORPHAN HBR, GUAIFENESIN 600 MILLIGRAM(S): 200 LIQUID ORAL at 05:57

## 2025-05-02 RX ADMIN — Medication 1 PUFF(S): at 06:03

## 2025-05-02 RX ADMIN — APIXABAN 2.5 MILLIGRAM(S): 2.5 TABLET, FILM COATED ORAL at 05:58

## 2025-05-02 RX ADMIN — Medication 200 MILLIGRAM(S): at 05:57

## 2025-05-02 RX ADMIN — PREDNISONE 20 MILLIGRAM(S): 20 TABLET ORAL at 05:58

## 2025-05-02 RX ADMIN — LOSARTAN POTASSIUM 100 MILLIGRAM(S): 100 TABLET, FILM COATED ORAL at 05:58

## 2025-05-02 NOTE — CAREGIVER ENGAGEMENT NOTE - CAREGIVER EDUCATION NOTES - FREE TEXT
Per discussion w/ inpatient interdisciplinary treatment team this AM, patient is anticipated to be medically cleared for transition to next level of care tomorrow, 05/01/2025.  met w/ patient @ bedside on unit 1East to discuss potential discharge plan, @ which time patient confirmed to be understanding of and agreeable to recommendation for sub-acute rehab. She identified that she has never been to sub-acute rehab before and identified that her preferred facility choice is the [facility] in [place];  provided discharge planning resource folder (which consists of sub-acute rehab facility choice lists for [place] and [facility]), and  also provided education regarding insurance benefits and potential need for additional facility choices should her preferred facility be unable to accept.  sent sub-acute rehab referral accordingly & will await response regarding admission decision. Of note, no pre-authorization required as patient's primary health insurance is Medicare, and patient will need 3-night hospital stay per Medicare requirement for sub-acute rehab facility placement, meaning earliest available discharge would be tomorrow, 05/01/2025, if medically cleared.  to continue to follow.
Per discussion w/ inpatient interdisciplinary treatment team this AM, patient is medically cleared for transition to next level of care today, 05/02/2025.  spoke w/ Sharon in the admissions dept for patient's preferred sub-acute rehab facility choice, the [facility] in [place], @ [phone] who confirmed that patient has been accepted w/ bed available for a start of care date/time of today, 05/02/2025, @ 13:00. Next,  met w/ patient & her son Vadim @ bedside on unit 1East, @ which time both patient & her son confirmed to be understanding of and agreeable to the above discharge plan. Afterward,  requested non-emergent ambulance for discharge transport via sending electronic referral to [facility] -- trip assigned to transport provider DELORIS @ [phone]. Of note, no pre-authorization required for sub-acute rehab nor non-emergent ambulance transport as patient's primary health insurance is Medicare, and patient has already met Medicare's 3-night hospital stay requirement for all sub-acute rehab facility placements.    Attending MD Rodríguez & unit RN Jennifer both aware. Packet of clinical information, including non-emergent ambulance transport certification form, left in RN station on unit 1East to accompany patient to receiving facility.  to remain available for any continued needs.
Pt. is from Independent( senior)  living at [facility] /Room 313[address] , [place].      Dx Acute bronchitis with COPD.  malaise  weakness  emphysema  Ivf  cc/h, nebs and systemic steroids for COPD .    Hypoxia: ambulated her SPO2 dropped to 86% on RA, AB LevoFLOXacin          ----------------------------------   CM met with pt. at ED bedside, pt. A&O x 4 resting comfortably offering no complaints at this time.  CM explained role of CM and availability to assist with transition to home planning throughout  stay.   Provided CM contact information and  pt. verbalized understanding.      Pt. States Transition plan is TBD agree to Home care PT if recommended.  CM explained home care expectations, process, insurance provisions and home  safety. Offered list of [facility] and pt. chose [facility]. States her son Ian works for [employer].  Provided discharge resource folder.  CM will make referral accordingly, if needed.  Anticipate Transition Date TBD , Await a PT EVal .      DME :Owns no DME States very independent and active goes to the [employer] and exercise classes 6 x week PTA.  States she uses the elevator to 3 rd floor apt where she lives alone.    PCP is Dr. Dr. Parish [phone] - states she has appt. end of may informed she may need to change it to within a week after going home.      Pharmacy [facility]    Pt. verbalizing understanding and in agreement with Transition post acute plans are TBD.   CM to follow.         Pt. states martha Sosa [phone] is the closes in [place] and she asked CM to call him.    With pt. permission CM contacted martha Sosa introduced self and role of CM to assist with transition planning. Informed likely Home care services TBD with pt. chosen from [facility] list [facility] care.  States either family member or [employer] drivers will transport pt. home dependent on time of d/c.      Socorro Tiwari lives in [facility]   [phone]    Pt. states martha Sosa [phone] is the closes in [place] and she asked CM to call him.    Pt. states martha Jewell is HCP and lives in [phone]

## 2025-05-02 NOTE — DISCHARGE NOTE PROVIDER - ATTENDING DISCHARGE PHYSICAL EXAMINATION:
Vital Signs Last 24 Hrs  T(C): 36.6 (02 May 2025 09:45), Max: 36.6 (01 May 2025 14:09)  T(F): 97.9 (02 May 2025 09:45), Max: 97.9 (01 May 2025 14:09)  HR: 73 (02 May 2025 09:45) (65 - 102)  BP: 121/72 (02 May 2025 09:45) (100/57 - 161/74)  BP(mean): --  RR: 18 (02 May 2025 09:45) (18 - 18)  SpO2: 96% (02 May 2025 09:45) (95% - 98%)    Parameters below as of 02 May 2025 04:46  Patient On (Oxygen Delivery Method): nasal cannula  VSS  CONSTITUTIONAL: elderly female in no acute distress  SKIN: Skin exam is warm and dry, no acute rash.  HEAD: Normocephalic; atraumatic.  EYES: PERRL, EOM intact; conjunctiva and sclera clear.  ENT: No nasal discharge; airway clear. TMs clear.  NECK: Supple; non tender.  CARD: S1, S2 normal; no murmurs, gallops, or rubs.   RESP: No wheezes, rales or rhonchi.  ABD: Normal bowel sounds; soft; non-distended; non-tender; no hepatosplenomegaly.  EXT: Normal ROM. No clubbing, cyanosis    LYMPH: No acute cervical adenopathy.  NEURO: Alert, oriented. Grossly unremarkable. No focal deficits.  PSYCH: Cooperative, appropriate.

## 2025-05-02 NOTE — DISCHARGE NOTE PROVIDER - NSDCMRMEDTOKEN_GEN_ALL_CORE_FT
apixaban 2.5 mg oral tablet: 1 tab(s) orally 2 times a day  atorvastatin 10 mg oral tablet: 1 tab(s) orally once a day (at bedtime)  Cozaar 50 mg oral tablet: 1 tab(s) orally once a day  Evista 60 mg oral tablet: 1 tab(s) orally once a day  Flonase 50 mcg/inh nasal spray: 2 spray(s) nasal once a day  guaiFENesin 600 mg oral tablet, extended release: 1 tab(s) orally every 12 hours  ipratropium 17 mcg/inh inhalation aerosol: 1 puff(s) inhaled every 6 hours as needed for  bronchospasm  metoprolol tartrate 25 mg oral tablet: 1 tab(s) orally 2 times a day  polyethylene glycol 3350 oral powder for reconstitution: 17 gram(s) orally once a day  predniSONE 20 mg oral tablet: 1 tab(s) orally once a day til 5/3/25  Prilosec OTC 20 mg oral delayed release tablet: 1 tab(s) orally once a day  senna leaf extract oral tablet: 1 tab(s) orally once a day

## 2025-05-02 NOTE — DISCHARGE NOTE PROVIDER - NSDCCPCAREPLAN_GEN_ALL_CORE_FT
PRINCIPAL DISCHARGE DIAGNOSIS  Diagnosis: Acute bronchitis with COPD  Assessment and Plan of Treatment: finish prednisone course, follow up with pcp in 1-2 weeks      SECONDARY DISCHARGE DIAGNOSES  Diagnosis: Coughing  Assessment and Plan of Treatment:     Diagnosis: New onset atrial fibrillation  Assessment and Plan of Treatment: paroxysmal AF, now back in SR, follow up with cardiology in 1-2 weeks

## 2025-05-02 NOTE — CASE MANAGEMENT PROGRESS NOTE - NSCMPROGRESSNOTE_GEN_ALL_CORE
Update Communication Note: As per morning rounds,  89 year old female with a history of HTN, COPD, Crohn's disease who presented initially with sore throat, cough, shortness of breath in the setting of presumed viral infection, COPD exacerbation. From [facility]: Pending Rehab Today. On Eliquis for Afib. Patient stated had not having Bowel Movement in 4 days. MiraLAX ordered. AS per DR. Rodríguez wants patient to be seen again by Physical Therapy to see how she does, Home PT vs MUNIR. CM called PT office and spoke with Padmini and stated they have  physical therapist coming in at 4 clock today. Patient pending to be seen again.  Will continue to follow patient.

## 2025-05-02 NOTE — SOCIAL WORK PROGRESS NOTE - NSSWPROGRESSNOTE_GEN_ALL_CORE
Per discussion w/ inpatient interdisciplinary treatment team this AM, patient is medically cleared for transition to next level of care today, 05/02/2025.  spoke w/ Sharon in the admissions dept for patient's preferred sub-acute rehab facility choice, the [facility] in [place], @ [phone] who confirmed that patient has been accepted w/ bed available for a start of care date/time of today, 05/02/2025, @ 13:00. Next,  met w/ patient & her son Vadim @ bedside on unit 1East, @ which time both patient & her son confirmed to be understanding of and agreeable to the above discharge plan. Afterward,  requested non-emergent ambulance for discharge transport via sending electronic referral to [facility] -- trip assigned to transport provider DELORIS @ [phone]. Of note, no pre-authorization required for sub-acute rehab nor non-emergent ambulance transport as patient's primary health insurance is Medicare, and patient has already met Medicare's 3-night hospital stay requirement for all sub-acute rehab facility placements.    Attending MD Rodríguez & unit RN Jennifer both aware. Packet of clinical information, including non-emergent ambulance transport certification form, left in RN station on unit 1East to accompany patient to receiving facility.  to remain available for any continued needs.

## 2025-05-02 NOTE — PROGRESS NOTE ADULT - ASSESSMENT
89 y/o F with PMH of HTN, COPD, COVID-19 in 2022, Nephrolithiasis, Osteoporosis on Raloxifene, Crohn's Disease, and GERD presented with flu-like symptoms.       Acute hypoxic respiratory failure 2/2 bronchitis with COPD. - breathing improved  likely viral. WBC normalized,   - having hallucinations, likely 2/2 levaquin, steroid dose is not particularly large, doubt steroid induced psychosis  - stopping levaquin, monitor off  - continue prednisone 20 mg  - continue duonebs  - pulm, ID following        Abnormal glucose level.   likely 2/2 steroids, stress, mild.   .    Osteoporosis.   on daily Raloxifene, continue.     HTN (hypertension).   continue on Procardia, Atenolol, and Cozaar with hold parameters.    dvt ppx - lovenox    
89 y/o F with PMH of HTN, COPD, COVID-19 in 2022, Nephrolithiasis, Osteoporosis on Raloxifene, Crohn's Disease, and GERD presented with flu-like symptoms.       Acute hypoxic respiratory failure 2/2 bronchitis with COPD. - breathing improved although still desaturating with ambulation  likely viral. WBC normalized,   - having hallucinations, resolved after stopping levaquin. stable off abx  - procalcitonin 0.03. not indicative of bacterial infection  - continue prednisone 20 mg - can finish 5 day course  - continue duonebs  - pulm, ID following  - PT recommending MUNIR, likely dc tomorrow      Abnormal glucose level.   likely 2/2 steroids, stress, mild.       Osteoporosis.   on daily Raloxifene, continue.     HTN (hypertension).   continue on Procardia, Atenolol, and Cozaar      dvt ppx - lovenox  
89 y/o F with PMH of HTN, COPD, COVID-19 in 2022, Nephrolithiasis, Osteoporosis on Raloxifene, Crohn's Disease, and GERD who presented with 5 days of flu-like symptoms, started as sore throat that rapidly got much worse, with hoarsness of her voice, then next day she started coughing with thick non bloody sputum that turned green in color    bronchitis  malaise  weakness  emphysema  copd ex  HTN  OP  OA  IBD  GERD    VBG noted  ID eval noted  Quinolone on order    emp ABX   cx -   trend WBC  no evidence of consolidation on CT chest - secretions - emphysema -   nebs and systemic steroids for COPD  I kizzy  cvs rx regimen  BP control  cough rx regimen  nutrition  hydration  dvt p  oral hygiene  skin care  PPI  CT chest reviewed
The patient is a 89 year old female with a history of HTN, COPD, Crohn's disease who presented initially with sore throat, cough, shortness of breath in the setting of presumed viral infection, COPD exacerbation.    Plan:  - ECG with sinus rhythm and nonspecific findings  - Echo with normal LV systolic function, mild pulm HTN  - Telemetry with atrial fibrillation in the 140-150 heart rate range - converted back to sinus rhythm  - CT chest with emphysema  - Continue metoprolol tartrate 25 mg bid  - Continue apixaban 2.5 mg bid  - Discontinue aspirin  - Continue losartan 100 mg daily  - Ok to resume nifedipine  - Outpatient cardiology follow-up for further cardiac monitoring to see if there is a recurrence of atrial fibrillation
89 y/o F with PMH of HTN, COPD, COVID-19 in 2022, Nephrolithiasis, Osteoporosis on Raloxifene, Crohn's Disease, and GERD who presented with 5 days of flu-like symptoms, started as sore throat that rapidly got much worse, with hoarsness of her voice, then next day she started coughing with thick non bloody sputum that turned green in color    bronchitis  malaise  weakness  emphysema  copd ex  HTN  OP  OA  IBD  GERD    VBG noted  ID eval noted  nebs  steroids - 5 day course  dc planned  sw follow up reviewed    cx -   trend WBC  no evidence of consolidation on CT chest - secretions - emphysema -   nebs and systemic steroids for COPD  I kizzy  cvs rx regimen  BP control  cough rx regimen  nutrition  hydration  dvt p  oral hygiene  skin care  PPI  CT chest reviewed  
89 y/o F with PMH of HTN, COPD, COVID-19 in 2022, Nephrolithiasis, Osteoporosis on Raloxifene, Crohn's Disease, and GERD who presented with 5 days of flu-like symptoms, started as sore throat that rapidly got much worse, with hoarsness of her voice, then next day she started coughing with thick non bloody sputum that turned green in color    bronchitis  malaise  weakness  emphysema  copd ex  HTN  OP  OA  IBD  GERD    atrovent -   tte - valv heart disease  cardio eval noted    cx -   trend WBC  no evidence of consolidation on CT chest - secretions - emphysema -   Bronchodilators - and systemic steroids for COPD  I kizzy  cvs rx regimen  BP control  cough rx regimen  nutrition  hydration  dvt p  oral hygiene  skin care  PPI  CT chest reviewed
89 y/o F with PMH of HTN, COPD, COVID-19 in 2022, Nephrolithiasis, Osteoporosis on Raloxifene, Crohn's Disease, and GERD presented with flu-like symptoms.       Acute hypoxic respiratory failure 2/2 bronchitis with COPD. - breathing improved although still desaturating with ambulation  likely viral. WBC normalized,   - having hallucinations, resolved after stopping levaquin. stable off abx  - procalcitonin 0.03. not indicative of bacterial infection  - continue prednisone 20 mg - can finish 5 day course - stopping 5/3  - stopped duonebs, started atrovent only  - pulm, ID following    New onset atrial fibrillation - with RVR  - given dose of metoprolol 5 mg IV, cardizem 10 x1 dose changed atenolol to metoprolol 25 bid  - starting on eliquis  - cardio eval, Seen by Dr. greene  - echocardiogram ordered  - if rate controlled possible DC tomorrow    HTN- procardia atenolol stopped, metoprolol started, continue losartan      Abnormal glucose level.   likely 2/2 steroids, stress, mild.       Osteoporosis.   on daily Raloxifene, continue.     HTN (hypertension).   continue on Procardia, Atenolol, and Cozaar      dvt ppx - lovenox    
89 y/o F with PMH of HTN, COPD, COVID-19 in 2022, Nephrolithiasis, Osteoporosis on Raloxifene, Crohn's Disease, and GERD who presented with 5 days of flu-like symptoms, started as sore throat that rapidly got much worse, with hoarsness of her voice, then next day she started coughing with thick non bloody sputum that turned green in color    bronchitis  malaise  weakness  emphysema  copd ex  HTN  OP  OA  IBD  GERD    VBG noted  ID eval noted  nebs  steroids    cx -   trend WBC  no evidence of consolidation on CT chest - secretions - emphysema -   nebs and systemic steroids for COPD  I kizzy  cvs rx regimen  BP control  cough rx regimen  nutrition  hydration  dvt p  oral hygiene  skin care  PPI  CT chest reviewed

## 2025-05-02 NOTE — DISCHARGE NOTE NURSING/CASE MANAGEMENT/SOCIAL WORK - NSDCVIVACCINE_GEN_ALL_CORE_FT
Tdap; 05-Sep-2023 12:59; Ruma Gallegos (GALEN); [employer]; [id number] (Exp. Date: 23-Aug-2025); IntraMuscular; Deltoid Left.; 0.5 milliLiter(s); VIS (VIS Published: 09-May-2013, VIS Presented: 05-Sep-2023);

## 2025-05-02 NOTE — PROGRESS NOTE ADULT - PROVIDER SPECIALTY LIST ADULT
Infectious Disease
Pulmonology
Cardiology
Hospitalist

## 2025-05-02 NOTE — DISCHARGE NOTE NURSING/CASE MANAGEMENT/SOCIAL WORK - PATIENT PORTAL LINK FT
You can access the FollowMyHealth Patient Portal offered by [facility] by registering at the following website: [email or web address]. By joining [facility]’s FollowMyHealth portal, you will also be able to view your health information using other applications (apps) compatible with our system.

## 2025-05-02 NOTE — DISCHARGE NOTE PROVIDER - CARE PROVIDER_API CALL
Lorenzo Parish  Cardiovascular Disease  [address] [place] [address] [id number]  Phone: [phone]  Fax: [phone]  Established Patient  Follow Up Time: 2 weeks

## 2025-05-02 NOTE — PROGRESS NOTE ADULT - SUBJECTIVE AND OBJECTIVE BOX
Chief Complaint: Cough, shortness of breath    Interval Events: No events overnight.    Review of Systems:  General: No fevers, chills, weight gain  Skin: No rashes, color changes  Cardiovascular: No chest pain, orthopnea  Respiratory: No shortness of breath, cough  Gastrointestinal: No nausea, abdominal pain  Genitourinary: No incontinence, pain with urination  Musculoskeletal: No pain, swelling, decreased range of motion  Neurological: No headache, weakness  Psychiatric: No depression, anxiety  Endocrine: No weight gain, increased thirst  All other systems are comprehensively negative.    Physical Exam:  Vitals:        Vital Signs Last 24 Hrs  T(C): 36.3 (02 May 2025 04:46), Max: 36.6 (01 May 2025 10:01)  T(F): 97.4 (02 May 2025 04:46), Max: 97.9 (01 May 2025 10:01)  HR: 70 (02 May 2025 04:46) (65 - 102)  BP: 161/74 (02 May 2025 04:46) (94/53 - 161/74)  BP(mean): --  RR: 18 (02 May 2025 04:46) (18 - 18)  SpO2: 98% (02 May 2025 04:46) (95% - 98%)  Parameters below as of 02 May 2025 04:46  Patient On (Oxygen Delivery Method): nasal cannula  General: NAD  HEENT: MMM  Neck: No JVD, no carotid bruit  Lungs: CTAB  CV: RRR, nl S1/S2, no M/R/G  Abdomen: S/NT/ND, +BS  Extremities: No LE edema, no cyanosis  Neuro: AAOx3, non-focal  Skin: No rash    Labs:                        15.1   11.92 )-----------( 271      ( 02 May 2025 07:55 )             47.5     05-02    145  |  104  |  20  ----------------------------<  81  4.0   |  35[H]  |  0.64    Ca    9.2      02 May 2025 07:55              ECG/Telemetry: Atrial fibrillation -> sinus rhythm
Date/Time Patient Seen:  		  Referring MD:   Data Reviewed	       Patient is a 89y old  Female who presents with a chief complaint of Flu-like symptoms. (01 May 2025 12:03)      Subjective/HPI     PAST MEDICAL & SURGICAL HISTORY:  Hypertension    Osteoporosis    No significant past surgical history          Medication list         MEDICATIONS  (STANDING):  apixaban 2.5 milliGRAM(s) Oral two times a day  atorvastatin 10 milliGRAM(s) Oral at bedtime  benzonatate 200 milliGRAM(s) Oral three times a day  fluticasone propionate 50 MICROgram(s)/spray Nasal Spray 2 Spray(s) Both Nostrils two times a day  guaiFENesin  milliGRAM(s) Oral every 12 hours  ipratropium 17 MICROgram(s) HFA Inhaler 1 Puff(s) Inhalation every 6 hours  losartan 100 milliGRAM(s) Oral daily  metoprolol tartrate 25 milliGRAM(s) Oral two times a day  pantoprazole    Tablet 40 milliGRAM(s) Oral before breakfast  predniSONE   Tablet 20 milliGRAM(s) Oral daily  raloxifene 60 milliGRAM(s) Oral daily    MEDICATIONS  (PRN):  benzocaine/menthol Lozenge 1 Lozenge Oral every 3 hours PRN Sore Throat         Vitals log        ICU Vital Signs Last 24 Hrs  T(C): 36.3 (02 May 2025 04:46), Max: 36.6 (01 May 2025 10:01)  T(F): 97.4 (02 May 2025 04:46), Max: 97.9 (01 May 2025 10:01)  HR: 70 (02 May 2025 04:46) (65 - 147)  BP: 161/74 (02 May 2025 04:46) (94/53 - 161/74)  BP(mean): --  ABP: --  ABP(mean): --  RR: 18 (02 May 2025 04:46) (18 - 18)  SpO2: 98% (02 May 2025 04:46) (94% - 98%)    O2 Parameters below as of 02 May 2025 04:46  Patient On (Oxygen Delivery Method): nasal cannula                 Input and Output:  I&O's Detail      Lab Data    04-30    142  |  104  |  12  ----------------------------<  82  3.6   |  33[H]  |  0.60    Ca    9.4      30 Apr 2025 08:09              Review of Systems	      Objective     Physical Examination    heart s1s2  lung dc bs  head nc  head at      Pertinent Lab findings & Imaging      Yoshi:  NO   Adequate UO     I&O's Detail           Discussed with:     Cultures:	        Radiology                            
Date/Time Patient Seen:  		  Referring MD:   Data Reviewed	       Patient is a 89y old  Female who presents with a chief complaint of Flu-like symptoms. (30 Apr 2025 12:54)      Subjective/HPI     PAST MEDICAL & SURGICAL HISTORY:  Hypertension    Osteoporosis    No significant past surgical history          Medication list         MEDICATIONS  (STANDING):  albuterol/ipratropium for Nebulization 3 milliLiter(s) Nebulizer every 6 hours  aspirin enteric coated 81 milliGRAM(s) Oral daily  atenolol  Tablet 25 milliGRAM(s) Oral daily  atorvastatin 10 milliGRAM(s) Oral at bedtime  benzonatate 200 milliGRAM(s) Oral three times a day  enoxaparin Injectable 30 milliGRAM(s) SubCutaneous every 24 hours  fluticasone propionate 50 MICROgram(s)/spray Nasal Spray 2 Spray(s) Both Nostrils two times a day  guaiFENesin  milliGRAM(s) Oral every 12 hours  losartan 100 milliGRAM(s) Oral daily  NIFEdipine XL 30 milliGRAM(s) Oral daily  pantoprazole    Tablet 40 milliGRAM(s) Oral before breakfast  predniSONE   Tablet 20 milliGRAM(s) Oral daily  raloxifene 60 milliGRAM(s) Oral daily    MEDICATIONS  (PRN):  albuterol    90 MICROgram(s) HFA Inhaler 1 Puff(s) Inhalation every 4 hours PRN Shortness of Breath and/or Wheezing  benzocaine/menthol Lozenge 1 Lozenge Oral every 3 hours PRN Sore Throat         Vitals log        ICU Vital Signs Last 24 Hrs  T(C): 36.5 (01 May 2025 05:10), Max: 36.6 (30 Apr 2025 16:24)  T(F): 97.7 (01 May 2025 05:10), Max: 97.8 (30 Apr 2025 16:24)  HR: 70 (01 May 2025 05:10) (67 - 76)  BP: 134/67 (01 May 2025 05:10) (109/58 - 136/67)  BP(mean): --  ABP: --  ABP(mean): --  RR: 18 (01 May 2025 05:10) (18 - 18)  SpO2: 97% (01 May 2025 05:10) (94% - 97%)    O2 Parameters below as of 01 May 2025 05:10  Patient On (Oxygen Delivery Method): nasal cannula                 Input and Output:  I&O's Detail      Lab Data    04-30    142  |  104  |  12  ----------------------------<  82  3.6   |  33[H]  |  0.60    Ca    9.4      30 Apr 2025 08:09              Review of Systems	      Objective     Physical Examination    heart s1s2  lung dc bs  head nc  head at      Pertinent Lab findings & Imaging      Yoshi:  NO   Adequate UO     I&O's Detail           Discussed with:     Cultures:	        Radiology                            
Patient is a 89y old  Female who presents with a chief complaint of Flu-like symptoms. (01 May 2025 12:03)      INTERVAL HPI/OVERNIGHT EVENTS:  Patient seen and examined in am, feels fair ,some dizziness with getting up and walking, no palpitations, found to be in AF with RVR on telemetry.     ROS reviewed and is otherwise negative        Vital Signs Last 24 Hrs  T(C): 36.6 (01 May 2025 10:01), Max: 36.6 (30 Apr 2025 16:24)  T(F): 97.9 (01 May 2025 10:01), Max: 97.9 (01 May 2025 10:01)  HR: 121 (01 May 2025 07:48) (67 - 147)  BP: 127/86 (01 May 2025 08:25) (109/58 - 136/67)  BP(mean): --  RR: 18 (01 May 2025 08:25) (18 - 18)  SpO2: 94% (01 May 2025 08:25) (94% - 98%)    Parameters below as of 01 May 2025 07:48  Patient On (Oxygen Delivery Method): nasal cannula        PHYSICAL EXAM:  GENERAL: NAD, well-groomed, elderly female  HEAD:  Atraumatic, Normocephalic  EYES: EOMI, PERRLA  ENMT: Moist mucous membranes, No lesions  NECK: Supple, No JVD  NERVOUS SYSTEM:  Alert & Oriented X3, Good concentration; All 4 extremities mobile, no gross sensory deficits.   CHEST/LUNG: fair air entry bilaterally; No rales, wheezing noted, no stridor  HEART: irregular, tachy, no murmur noted  ABDOMEN: Soft, Nontender, Nondistended; Bowel sounds present  EXTREMITIES:  + Pulses, No clubbing      MEDICATIONS  (STANDING):  apixaban 2.5 milliGRAM(s) Oral two times a day  atorvastatin 10 milliGRAM(s) Oral at bedtime  benzonatate 200 milliGRAM(s) Oral three times a day  fluticasone propionate 50 MICROgram(s)/spray Nasal Spray 2 Spray(s) Both Nostrils two times a day  guaiFENesin  milliGRAM(s) Oral every 12 hours  ipratropium 17 MICROgram(s) HFA Inhaler 1 Puff(s) Inhalation every 6 hours  losartan 100 milliGRAM(s) Oral daily  metoprolol tartrate 25 milliGRAM(s) Oral two times a day  pantoprazole    Tablet 40 milliGRAM(s) Oral before breakfast  predniSONE   Tablet 20 milliGRAM(s) Oral daily  raloxifene 60 milliGRAM(s) Oral daily    MEDICATIONS  (PRN):  benzocaine/menthol Lozenge 1 Lozenge Oral every 3 hours PRN Sore Throat      Allergies    penicillins (Anaphylaxis)  epinephrine (Anaphylaxis; Hypotension; Short breath)    Intolerances          LABS:      Ca    9.4        30 Apr 2025 08:09        Urinalysis Basic - ( 30 Apr 2025 08:09 )    Color: x / Appearance: x / SG: x / pH: x  Gluc: 82 mg/dL / Ketone: x  / Bili: x / Urobili: x   Blood: x / Protein: x / Nitrite: x   Leuk Esterase: x / RBC: x / WBC x   Sq Epi: x / Non Sq Epi: x / Bacteria: x      CAPILLARY BLOOD GLUCOSE          RADIOLOGY & ADDITIONAL TESTS:        Care Discussed with Consultants/Other Providers:    Advanced Directives: [ ] DNR  [ ] No feeding tube  [ ] MOLST in chart  [ ] MOLST completed today  [ ] Unknown  
Patient is a 89y old  Female who presents with a chief complaint of Flu-like symptoms. (30 Apr 2025 05:17)      INTERVAL HPI/OVERNIGHT EVENTS:  Patient seen and examined in am, breathing fair, having intermittent dyspnea and stridor in neck, improved after clearing cough. no longer having hallucinations    ROS reviewed and is otherwise negative        Vital Signs Last 24 Hrs  T(C): 36.5 (30 Apr 2025 09:30), Max: 36.7 (29 Apr 2025 21:09)  T(F): 97.7 (30 Apr 2025 09:30), Max: 98.1 (29 Apr 2025 21:09)  HR: 70 (30 Apr 2025 09:30) (70 - 93)  BP: 133/67 (30 Apr 2025 09:30) (133/67 - 171/84)  BP(mean): --  RR: 18 (30 Apr 2025 09:30) (18 - 18)  SpO2: 94% (30 Apr 2025 09:30) (94% - 96%)    Parameters below as of 30 Apr 2025 04:56  Patient On (Oxygen Delivery Method): nasal cannula        PHYSICAL EXAM:  GENERAL: NAD, well-groomed, elderly female  HEAD:  Atraumatic, Normocephalic  EYES: EOMI, PERRLA  ENMT: Moist mucous membranes, No lesions  NECK: Supple, No JVD  NERVOUS SYSTEM:  Alert & Oriented X3, Good concentration; All 4 extremities mobile, no gross sensory deficits.   CHEST/LUNG: fair air entry bilaterally; No rales, wheezing noted, no stridor  HEART: Regular rate and rhythm; No murmurs, rubs, or gallops  ABDOMEN: Soft, Nontender, Nondistended; Bowel sounds present  EXTREMITIES:  + Pulses, No clubbing    MEDICATIONS  (STANDING):  albuterol/ipratropium for Nebulization 3 milliLiter(s) Nebulizer every 6 hours  aspirin enteric coated 81 milliGRAM(s) Oral daily  atenolol  Tablet 25 milliGRAM(s) Oral daily  atorvastatin 10 milliGRAM(s) Oral at bedtime  benzonatate 200 milliGRAM(s) Oral three times a day  enoxaparin Injectable 30 milliGRAM(s) SubCutaneous every 24 hours  fluticasone propionate 50 MICROgram(s)/spray Nasal Spray 2 Spray(s) Both Nostrils two times a day  guaiFENesin  milliGRAM(s) Oral every 12 hours  losartan 100 milliGRAM(s) Oral daily  NIFEdipine XL 30 milliGRAM(s) Oral daily  pantoprazole    Tablet 40 milliGRAM(s) Oral before breakfast  predniSONE   Tablet 20 milliGRAM(s) Oral daily  raloxifene 60 milliGRAM(s) Oral daily    MEDICATIONS  (PRN):  albuterol    90 MICROgram(s) HFA Inhaler 1 Puff(s) Inhalation every 4 hours PRN Shortness of Breath and/or Wheezing  benzocaine/menthol Lozenge 1 Lozenge Oral every 3 hours PRN Sore Throat      Allergies    penicillins (Anaphylaxis)  epinephrine (Anaphylaxis; Hypotension; Short breath)    Intolerances          LABS:    30 Apr 2025 08:09    142    |  104    |  12     ----------------------------<  82     3.6     |  33     |  0.60     Ca    9.4        30 Apr 2025 08:09        Urinalysis Basic - ( 30 Apr 2025 08:09 )    Color: x / Appearance: x / SG: x / pH: x  Gluc: 82 mg/dL / Ketone: x  / Bili: x / Urobili: x   Blood: x / Protein: x / Nitrite: x   Leuk Esterase: x / RBC: x / WBC x   Sq Epi: x / Non Sq Epi: x / Bacteria: x      CAPILLARY BLOOD GLUCOSE          RADIOLOGY & ADDITIONAL TESTS:        Care Discussed with Consultants/Other Providers:    Advanced Directives: [ ] DNR  [ ] No feeding tube  [ ] MOLST in chart  [ ] MOLST completed today  [ ] Unknown  
     MART SCHMIDT is a 89yFemale , patient examined and chart reviewed.     INTERVAL HPI/ OVERNIGHT EVENTS:   Events noted Had episode of hallucinations last night after nebs.  Afebrile.    PAST MEDICAL & SURGICAL HISTORY:  Hypertension  Osteoporosis    For details regarding the patient's social history, family history, and other miscellaneous elements, please refer the initial infectious diseases consultation and/or the admitting history and physical examination for this admission.    ROS:  CONSTITUTIONAL:  Negative fever or chills  EYES:  Negative  blurry vision or double vision  CARDIOVASCULAR:  Negative for chest pain or palpitations  RESPIRATORY:  Negative for cough, wheezing, or SOB   GASTROINTESTINAL:  Negative for nausea, vomiting, diarrhea, constipation, or abdominal pain  GENITOURINARY:  Negative frequency, urgency or dysuria  NEUROLOGIC:  No headache, confusion, dizziness, lightheadedness+ hallucinations  All other systems were reviewed and are negative     ALLERGIES  penicillins (Anaphylaxis)  epinephrine (Anaphylaxis; Hypotension; Short breath)      Current inpatient medications :    ANTIBIOTICS/RELEVANT:      albuterol/ipratropium for Nebulization 3 milliLiter(s) Nebulizer every 6 hours  aspirin enteric coated 81 milliGRAM(s) Oral daily  atenolol  Tablet 25 milliGRAM(s) Oral daily  atorvastatin 10 milliGRAM(s) Oral at bedtime  benzocaine/menthol Lozenge 1 Lozenge Oral every 3 hours PRN  benzonatate 200 milliGRAM(s) Oral three times a day  enoxaparin Injectable 30 milliGRAM(s) SubCutaneous every 24 hours  fluticasone propionate 50 MICROgram(s)/spray Nasal Spray 2 Spray(s) Both Nostrils two times a day  guaiFENesin  milliGRAM(s) Oral every 12 hours  losartan 100 milliGRAM(s) Oral daily  NIFEdipine XL 30 milliGRAM(s) Oral daily  pantoprazole    Tablet 40 milliGRAM(s) Oral before breakfast  predniSONE   Tablet 20 milliGRAM(s) Oral daily  raloxifene 60 milliGRAM(s) Oral daily      Objective:    T(C): 36.7 (04-29-25 @ 21:09), Max: 36.7 (04-29-25 @ 21:09)  HR: 72 (04-29-25 @ 21:09) (72 - 86)  BP: 163/79 (04-29-25 @ 21:09) (114/65 - 163/79)  RR: 18 (04-29-25 @ 21:09) (18 - 18)  SpO2: 96% (04-29-25 @ 21:09) (93% - 97%)      Physical Exam:  General:  no acute distress  Neck: supple, trachea midline  Lungs: clear, no wheeze/rhonchi  Cardiovascular: regular rate and rhythm, S1 S2  Abdomen: soft, nontender,  bowel sounds normal  Neurological: alert and oriented x3  Skin: no rash  Extremities: no edema        LABS:                          13.2   8.82  )-----------( 218      ( 28 Apr 2025 07:30 )             40.6       04-28    142  |  108  |  21  ----------------------------<  150[H]  4.6   |  27  |  0.73    Ca    8.9      28 Apr 2025 07:30    MICROBIOLOGY:      RADIOLOGY & ADDITIONAL STUDIES:    ACC: [id number] EXAM:  CT CHEST   ORDERED BY: CELSO LEI     PROCEDURE DATE:  04/27/2025          INTERPRETATION:  CLINICAL INFORMATION: Cough, congestion, possible   consolidation on x-ray.    COMPARISON: Same day chest radiograph, 6/20/2014 CT    CONTRAST/COMPLICATIONS:  IV Contrast: NONE  Oral Contrast: NONE  .    PROCEDURE:  CT of the Chest was performed.  Sagittal and coronal reformats were performed.    FINDINGS:  Evaluation of solid organs and vascular structures is limited without   intravenous contrast.  LUNGS AND LARGE AIRWAYS: Secretions in the bilateral mainstem bronchi.   Emphysema. Bibasilar bronchiectasis. No consolidation. Mild bibasilar   atelectasis. Subpleural reticulation in the right lower lobe possibly   reflecting mildfibrotic disease. Right upper lobe calcified granuloma.   Fat-containing left Bochdalek hernia.  PLEURA: No pleural effusion.  VESSELS: Aortic calcifications. Coronary artery calcifications.  HEART: Heart size is normal. No pericardial effusion. Mitral valve   annular calcifications.  MEDIASTINUM AND BENTON: No lymphadenopathy.  CHEST WALL AND LOWER NECK: Within normal limits.  VISUALIZED UPPER ABDOMEN: Diffuse cystic replacement of the pancreas   better evaluated on prior imaging. Right hepatic lobe cyst. Left renal   cysts. Left renal atrophy.  BONES: Degenerative changes.    IMPRESSION:  No CT evidence of pneumonia. Emphysema. Secretions in the bilateral   mainstem bronchi.    Assessment :   89YO F PMH of HTN, COPD, COVID-19 in 2022, Nephrolithiasis, Osteoporosis on Raloxifene, Crohn's Disease, and GERD who presented with 5 days of flu-like symptoms, started as sore throat that rapidly got much worse, with hoarsness of her voice, then next day she started coughing with thick non bloody sputum that turned green in color, no fever, but had chills. In ED afebrile wbc 13K  RVP neg CT chest neg for pna. Noted Emphysema. Secretions in the bilateral mainstem bronchi.  Likely viral acute bronchitis with poss bacterial component   Copd exacerbation  Acute pharyngitis  Leukocytosis Resolved  Hallucinations likely sec nebs  Clinically stable      Plan :   Off Levaquin and monitor off antibiotics  Fu cultures and throat culture  Steroid nebs per primary team  Trend temps and cbc  Pulm toileting  Asp precautions  Stable from ID standpoint    D/w Dr Rodríguez      Advance Directives- Full code  Current Medications are documented.   Drug-drug interactions reviewed.    Continue with present regiment.  Appropriate use of antibiotics and adverse effects reviewed.      I have discussed the above plan of care with patient in detail. She expressed understanding of the  treatment plan . Risks, benefits and alternatives discussed in detail. I have asked if she has any questions or concerns and appropriately addressed them to the best of my ability .    > 35 minutes were spent in direct patient care reviewing notes, medications ,labs data/ imaging , discussion with multidisciplinary team.    Thank you for allowing me to participate in care of your patient .    Mirna Henning MD  Infectious Disease  [phone]    Individualized infection control protocols for an individual patient based on their diagnosis and risks in order to reduce risk of disease transmission followed. Coordinating with  infection prevention and control team members to enable healthcare facility staff to safely care for patient.  Managing infection prevention and treatment protocols associated with transitions of care for complex patients.  In-depth patient chart review that entails going back farther in time and assessing the complete breadth of all health care interactions, with higher-level synthesis for complex diagnoses.  Engaging in complex medical decision-making associated with antimicrobial prescribing including considerations such as antimicrobial resistance patterns, emergence of new variants/strains, recent antibiotic exposure, interactions/complications from comorbidities including concurrent infections, public health considerations to minimize development of antimicrobial resistance, and emerging and re-emerging infections.       
     MART SCHMIDT is a 89yFemale , patient examined and chart reviewed.     INTERVAL HPI/ OVERNIGHT EVENTS:   Feeling better. But had rapid Afib.   Afebrile.    PAST MEDICAL & SURGICAL HISTORY:  Hypertension  Osteoporosis    For details regarding the patient's social history, family history, and other miscellaneous elements, please refer the initial infectious diseases consultation and/or the admitting history and physical examination for this admission.    ROS:  CONSTITUTIONAL:  Negative fever or chills  EYES:  Negative  blurry vision or double vision  CARDIOVASCULAR:  Negative for chest pain or palpitations  RESPIRATORY:  Negative for cough, wheezing, or SOB   GASTROINTESTINAL:  Negative for nausea, vomiting, diarrhea, constipation, or abdominal pain  GENITOURINARY:  Negative frequency, urgency or dysuria  NEUROLOGIC:  No headache, confusion, dizziness, lightheadedness  All other systems were reviewed and are negative     ALLERGIES  penicillins (Anaphylaxis)  epinephrine (Anaphylaxis; Hypotension; Short breath)      Current inpatient medications :    ANTIBIOTICS/RELEVANT:    MEDICATIONS  (STANDING):  apixaban 2.5 milliGRAM(s) Oral two times a day  atorvastatin 10 milliGRAM(s) Oral at bedtime  benzonatate 200 milliGRAM(s) Oral three times a day  fluticasone propionate 50 MICROgram(s)/spray Nasal Spray 2 Spray(s) Both Nostrils two times a day  guaiFENesin  milliGRAM(s) Oral every 12 hours  ipratropium 17 MICROgram(s) HFA Inhaler 1 Puff(s) Inhalation every 6 hours  losartan 100 milliGRAM(s) Oral daily  metoprolol tartrate 25 milliGRAM(s) Oral two times a day  pantoprazole    Tablet 40 milliGRAM(s) Oral before breakfast  predniSONE   Tablet 20 milliGRAM(s) Oral daily  raloxifene 60 milliGRAM(s) Oral daily    MEDICATIONS  (PRN):  benzocaine/menthol Lozenge 1 Lozenge Oral every 3 hours PRN Sore Throat      Objective:  Vital Signs Last 24 Hrs  T(C): 36.6 (01 May 2025 10:01), Max: 36.6 (30 Apr 2025 16:24)  T(F): 97.9 (01 May 2025 10:01), Max: 97.9 (01 May 2025 10:01)  HR: 121 (01 May 2025 07:48) (67 - 147)  BP: 127/86 (01 May 2025 08:25) (109/58 - 136/67)  RR: 18 (01 May 2025 08:25) (18 - 18)  SpO2: 94% (01 May 2025 08:25) (94% - 98%)    Parameters below as of 01 May 2025 07:48  Patient On (Oxygen Delivery Method): nasal cannula      Physical Exam:  General:  no acute distress  Neck: supple, trachea midline  Lungs: clear, no wheeze/rhonchi  Cardiovascular: regular rate and rhythm, S1 S2  Abdomen: soft, nontender,  bowel sounds normal  Neurological: alert and oriented x3  Skin: no rash  Extremities: no edema        LABS:        MICROBIOLOGY:  Culture - Group A Streptococcus (collected 29 Apr 2025 09:01)  Source: Throat Throat  Final Report (30 Apr 2025 17:44):    No Streptococcus pyogenes (Group A) isolated      RADIOLOGY & ADDITIONAL STUDIES:    ACC: [id number] EXAM:  CT CHEST   ORDERED BY: CELSO LEI     PROCEDURE DATE:  04/27/2025          INTERPRETATION:  CLINICAL INFORMATION: Cough, congestion, possible   consolidation on x-ray.    COMPARISON: Same day chest radiograph, 6/20/2014 CT    CONTRAST/COMPLICATIONS:  IV Contrast: NONE  Oral Contrast: NONE  .    PROCEDURE:  CT of the Chest was performed.  Sagittal and coronal reformats were performed.    FINDINGS:  Evaluation of solid organs and vascular structures is limited without   intravenous contrast.  LUNGS AND LARGE AIRWAYS: Secretions in the bilateral mainstem bronchi.   Emphysema. Bibasilar bronchiectasis. No consolidation. Mild bibasilar   atelectasis. Subpleural reticulation in the right lower lobe possibly   reflecting mildfibrotic disease. Right upper lobe calcified granuloma.   Fat-containing left Bochdalek hernia.  PLEURA: No pleural effusion.  VESSELS: Aortic calcifications. Coronary artery calcifications.  HEART: Heart size is normal. No pericardial effusion. Mitral valve   annular calcifications.  MEDIASTINUM AND BENTON: No lymphadenopathy.  CHEST WALL AND LOWER NECK: Within normal limits.  VISUALIZED UPPER ABDOMEN: Diffuse cystic replacement of the pancreas   better evaluated on prior imaging. Right hepatic lobe cyst. Left renal   cysts. Left renal atrophy.  BONES: Degenerative changes.    IMPRESSION:  No CT evidence of pneumonia. Emphysema. Secretions in the bilateral   mainstem bronchi.    Assessment :   89YO F PMH of HTN, COPD, COVID-19 in 2022, Nephrolithiasis, Osteoporosis on Raloxifene, Crohn's Disease, and GERD who presented with 5 days of flu-like symptoms, started as sore throat that rapidly got much worse, with hoarsness of her voice, then next day she started coughing with thick non bloody sputum that turned green in color, no fever, but had chills. In ED afebrile wbc 13K  RVP neg CT chest neg for pna. Noted Emphysema. Secretions in the bilateral mainstem bronchi.  Likely viral acute bronchitis with poss bacterial component   Copd exacerbation  Acute pharyngitis likely viral Throat culture neg  Leukocytosis Resolved  Hallucinations likely sec nebs- resolved  New onset Afib      Plan :   Monitor off antibiotics  Steroid nebs per primary team  Trend temps and cbc  Pulm toileting  Cardio on case  Asp precautions  Stable from ID standpoint  Dc planning per primary team      Advance Directives- Full code  Current Medications are documented.   Drug-drug interactions reviewed.    Continue with present regiment.  Appropriate use of antibiotics and adverse effects reviewed.      I have discussed the above plan of care with patient in detail. She expressed understanding of the  treatment plan . Risks, benefits and alternatives discussed in detail. I have asked if she has any questions or concerns and appropriately addressed them to the best of my ability .    > 35 minutes were spent in direct patient care reviewing notes, medications ,labs data/ imaging , discussion with multidisciplinary team.    Thank you for allowing me to participate in care of your patient .    Mirna Henning MD  Infectious Disease  [phone]    Individualized infection control protocols for an individual patient based on their diagnosis and risks in order to reduce risk of disease transmission followed. Coordinating with  infection prevention and control team members to enable healthcare facility staff to safely care for patient.  Managing infection prevention and treatment protocols associated with transitions of care for complex patients.  In-depth patient chart review that entails going back farther in time and assessing the complete breadth of all health care interactions, with higher-level synthesis for complex diagnoses.  Engaging in complex medical decision-making associated with antimicrobial prescribing including considerations such as antimicrobial resistance patterns, emergence of new variants/strains, recent antibiotic exposure, interactions/complications from comorbidities including concurrent infections, public health considerations to minimize development of antimicrobial resistance, and emerging and re-emerging infections.       
     MART SCHMIDT is a 89yFemale , patient examined and chart reviewed.     INTERVAL HPI/ OVERNIGHT EVENTS:   No events. Feeling better.  Afebrile.    PAST MEDICAL & SURGICAL HISTORY:  Hypertension  Osteoporosis    For details regarding the patient's social history, family history, and other miscellaneous elements, please refer the initial infectious diseases consultation and/or the admitting history and physical examination for this admission.    ROS:  CONSTITUTIONAL:  Negative fever or chills  EYES:  Negative  blurry vision or double vision  CARDIOVASCULAR:  Negative for chest pain or palpitations  RESPIRATORY:  Negative for cough, wheezing, or SOB   GASTROINTESTINAL:  Negative for nausea, vomiting, diarrhea, constipation, or abdominal pain  GENITOURINARY:  Negative frequency, urgency or dysuria  NEUROLOGIC:  No headache, confusion, dizziness, lightheadedness  All other systems were reviewed and are negative     ALLERGIES  penicillins (Anaphylaxis)  epinephrine (Anaphylaxis; Hypotension; Short breath)      Current inpatient medications :    ANTIBIOTICS/RELEVANT:    MEDICATIONS  (STANDING):  albuterol/ipratropium for Nebulization 3 milliLiter(s) Nebulizer every 6 hours  aspirin enteric coated 81 milliGRAM(s) Oral daily  atenolol  Tablet 25 milliGRAM(s) Oral daily  atorvastatin 10 milliGRAM(s) Oral at bedtime  benzonatate 200 milliGRAM(s) Oral three times a day  enoxaparin Injectable 30 milliGRAM(s) SubCutaneous every 24 hours  fluticasone propionate 50 MICROgram(s)/spray Nasal Spray 2 Spray(s) Both Nostrils two times a day  guaiFENesin  milliGRAM(s) Oral every 12 hours  losartan 100 milliGRAM(s) Oral daily  NIFEdipine XL 30 milliGRAM(s) Oral daily  pantoprazole    Tablet 40 milliGRAM(s) Oral before breakfast  predniSONE   Tablet 20 milliGRAM(s) Oral daily  raloxifene 60 milliGRAM(s) Oral daily    MEDICATIONS  (PRN):  albuterol    90 MICROgram(s) HFA Inhaler 1 Puff(s) Inhalation every 4 hours PRN Shortness of Breath and/or Wheezing  benzocaine/menthol Lozenge 1 Lozenge Oral every 3 hours PRN Sore Throat        Objective:  Vital Signs Last 24 Hrs  T(C): 36.6 (30 Apr 2025 21:09), Max: 36.6 (30 Apr 2025 16:24)  T(F): 97.8 (30 Apr 2025 21:09), Max: 97.8 (30 Apr 2025 16:24)  HR: 73 (30 Apr 2025 21:09) (70 - 93)  BP: 117/61 (30 Apr 2025 21:09) (109/58 - 171/84)  RR: 18 (30 Apr 2025 21:09) (18 - 18)  SpO2: 97% (30 Apr 2025 21:09) (94% - 97%)    Parameters below as of 30 Apr 2025 12:00  Patient On (Oxygen Delivery Method): nasal cannula  O2 Flow (L/min): 2      Physical Exam:  General:  no acute distress  Neck: supple, trachea midline  Lungs: clear, no wheeze/rhonchi  Cardiovascular: regular rate and rhythm, S1 S2  Abdomen: soft, nontender,  bowel sounds normal  Neurological: alert and oriented x3  Skin: no rash  Extremities: no edema        LABS:  04-30    142  |  104  |  12  ----------------------------<  82  3.6   |  33[H]  |  0.60    Ca    9.4      30 Apr 2025 08:09      MICROBIOLOGY:    Culture - Group A Streptococcus (collected 29 Apr 2025 09:01)  Source: Throat Throat  Final Report (30 Apr 2025 17:44):    No Streptococcus pyogenes (Group A) isolated      RADIOLOGY & ADDITIONAL STUDIES:    ACC: [id number] EXAM:  CT CHEST   ORDERED BY: CELSO LEI     PROCEDURE DATE:  04/27/2025          INTERPRETATION:  CLINICAL INFORMATION: Cough, congestion, possible   consolidation on x-ray.    COMPARISON: Same day chest radiograph, 6/20/2014 CT    CONTRAST/COMPLICATIONS:  IV Contrast: NONE  Oral Contrast: NONE  .    PROCEDURE:  CT of the Chest was performed.  Sagittal and coronal reformats were performed.    FINDINGS:  Evaluation of solid organs and vascular structures is limited without   intravenous contrast.  LUNGS AND LARGE AIRWAYS: Secretions in the bilateral mainstem bronchi.   Emphysema. Bibasilar bronchiectasis. No consolidation. Mild bibasilar   atelectasis. Subpleural reticulation in the right lower lobe possibly   reflecting mildfibrotic disease. Right upper lobe calcified granuloma.   Fat-containing left Bochdalek hernia.  PLEURA: No pleural effusion.  VESSELS: Aortic calcifications. Coronary artery calcifications.  HEART: Heart size is normal. No pericardial effusion. Mitral valve   annular calcifications.  MEDIASTINUM AND BENTON: No lymphadenopathy.  CHEST WALL AND LOWER NECK: Within normal limits.  VISUALIZED UPPER ABDOMEN: Diffuse cystic replacement of the pancreas   better evaluated on prior imaging. Right hepatic lobe cyst. Left renal   cysts. Left renal atrophy.  BONES: Degenerative changes.    IMPRESSION:  No CT evidence of pneumonia. Emphysema. Secretions in the bilateral   mainstem bronchi.    Assessment :   89YO F PMH of HTN, COPD, COVID-19 in 2022, Nephrolithiasis, Osteoporosis on Raloxifene, Crohn's Disease, and GERD who presented with 5 days of flu-like symptoms, started as sore throat that rapidly got much worse, with hoarsness of her voice, then next day she started coughing with thick non bloody sputum that turned green in color, no fever, but had chills. In ED afebrile wbc 13K  RVP neg CT chest neg for pna. Noted Emphysema. Secretions in the bilateral mainstem bronchi.  Likely viral acute bronchitis with poss bacterial component   Copd exacerbation  Acute pharyngitis likely viral Throat culture neg  Leukocytosis Resolved  Hallucinations likely sec nebs  Clinically stable      Plan :   Monitor off antibiotics  Steroid nebs per primary team  Trend temps and cbc  Pulm toileting  Asp precautions  Stable from ID standpoint  Dc planning per primary team      Advance Directives- Full code  Current Medications are documented.   Drug-drug interactions reviewed.    Continue with present regiment.  Appropriate use of antibiotics and adverse effects reviewed.      I have discussed the above plan of care with patient in detail. She expressed understanding of the  treatment plan . Risks, benefits and alternatives discussed in detail. I have asked if she has any questions or concerns and appropriately addressed them to the best of my ability .    > 35 minutes were spent in direct patient care reviewing notes, medications ,labs data/ imaging , discussion with multidisciplinary team.    Thank you for allowing me to participate in care of your patient .    Mirna Henning MD  Infectious Disease  [phone]    Individualized infection control protocols for an individual patient based on their diagnosis and risks in order to reduce risk of disease transmission followed. Coordinating with  infection prevention and control team members to enable healthcare facility staff to safely care for patient.  Managing infection prevention and treatment protocols associated with transitions of care for complex patients.  In-depth patient chart review that entails going back farther in time and assessing the complete breadth of all health care interactions, with higher-level synthesis for complex diagnoses.  Engaging in complex medical decision-making associated with antimicrobial prescribing including considerations such as antimicrobial resistance patterns, emergence of new variants/strains, recent antibiotic exposure, interactions/complications from comorbidities including concurrent infections, public health considerations to minimize development of antimicrobial resistance, and emerging and re-emerging infections.       
    MART SCHMIDT is a 89yFemale , patient examined and chart reviewed.     INTERVAL HPI/ OVERNIGHT EVENTS:   Feeling better. No events.  Afebrile.    PAST MEDICAL & SURGICAL HISTORY:  Hypertension  Osteoporosis    For details regarding the patient's social history, family history, and other miscellaneous elements, please refer the initial infectious diseases consultation and/or the admitting history and physical examination for this admission.    ROS:  CONSTITUTIONAL:  Negative fever or chills  EYES:  Negative  blurry vision or double vision  CARDIOVASCULAR:  Negative for chest pain or palpitations  RESPIRATORY:  Negative for cough, wheezing, or SOB   GASTROINTESTINAL:  Negative for nausea, vomiting, diarrhea, constipation, or abdominal pain  GENITOURINARY:  Negative frequency, urgency or dysuria  NEUROLOGIC:  No headache, confusion, dizziness, lightheadedness  All other systems were reviewed and are negative     ALLERGIES  penicillins (Anaphylaxis)  epinephrine (Anaphylaxis; Hypotension; Short breath)      Current inpatient medications :    ANTIBIOTICS/RELEVANT:    MEDICATIONS  (STANDING):  apixaban 2.5 milliGRAM(s) Oral two times a day  atorvastatin 10 milliGRAM(s) Oral at bedtime  benzonatate 200 milliGRAM(s) Oral three times a day  fluticasone propionate 50 MICROgram(s)/spray Nasal Spray 2 Spray(s) Both Nostrils two times a day  guaiFENesin  milliGRAM(s) Oral every 12 hours  ipratropium 17 MICROgram(s) HFA Inhaler 1 Puff(s) Inhalation every 6 hours  losartan 100 milliGRAM(s) Oral daily  metoprolol tartrate 25 milliGRAM(s) Oral two times a day  pantoprazole    Tablet 40 milliGRAM(s) Oral before breakfast  polyethylene glycol 3350 17 Gram(s) Oral daily  predniSONE   Tablet 20 milliGRAM(s) Oral daily  raloxifene 60 milliGRAM(s) Oral daily  senna 1 Tablet(s) Oral daily    MEDICATIONS  (PRN):  benzocaine/menthol Lozenge 1 Lozenge Oral every 3 hours PRN Sore Throat        Objective:  Vital Signs Last 24 Hrs  T(C): 36.6 (02 May 2025 09:45), Max: 36.6 (01 May 2025 14:09)  T(F): 97.9 (02 May 2025 09:45), Max: 97.9 (01 May 2025 14:09)  HR: 73 (02 May 2025 09:45) (65 - 102)  BP: 121/72 (02 May 2025 09:45) (100/57 - 161/74)  RR: 18 (02 May 2025 09:45) (18 - 18)  SpO2: 96% (02 May 2025 09:45) (95% - 98%)    Parameters below as of 02 May 2025 04:46  Patient On (Oxygen Delivery Method): nasal cannula      Physical Exam:  General:  no acute distress  Neck: supple, trachea midline  Lungs: clear, no wheeze/rhonchi  Cardiovascular: regular rate and rhythm, S1 S2  Abdomen: soft, nontender,  bowel sounds normal  Neurological: alert and oriented x3  Skin: no rash  Extremities: no edema      LABS:                        15.1   11.92 )-----------( 271      ( 02 May 2025 07:55 )             47.5   05-02    145  |  104  |  20  ----------------------------<  81  4.0   |  35[H]  |  0.64    Ca    9.2      02 May 2025 07:55    MICROBIOLOGY:  Culture - Group A Streptococcus (collected 29 Apr 2025 09:01)  Source: Throat Throat  Final Report (30 Apr 2025 17:44):    No Streptococcus pyogenes (Group A) isolated      RADIOLOGY & ADDITIONAL STUDIES:    ACC: [id number] EXAM:  CT CHEST   ORDERED BY: CELSO LEI     PROCEDURE DATE:  04/27/2025          INTERPRETATION:  CLINICAL INFORMATION: Cough, congestion, possible   consolidation on x-ray.    COMPARISON: Same day chest radiograph, 6/20/2014 CT    CONTRAST/COMPLICATIONS:  IV Contrast: NONE  Oral Contrast: NONE  .    PROCEDURE:  CT of the Chest was performed.  Sagittal and coronal reformats were performed.    FINDINGS:  Evaluation of solid organs and vascular structures is limited without   intravenous contrast.  LUNGS AND LARGE AIRWAYS: Secretions in the bilateral mainstem bronchi.   Emphysema. Bibasilar bronchiectasis. No consolidation. Mild bibasilar   atelectasis. Subpleural reticulation in the right lower lobe possibly   reflecting mildfibrotic disease. Right upper lobe calcified granuloma.   Fat-containing left Bochdalek hernia.  PLEURA: No pleural effusion.  VESSELS: Aortic calcifications. Coronary artery calcifications.  HEART: Heart size is normal. No pericardial effusion. Mitral valve   annular calcifications.  MEDIASTINUM AND BENTON: No lymphadenopathy.  CHEST WALL AND LOWER NECK: Within normal limits.  VISUALIZED UPPER ABDOMEN: Diffuse cystic replacement of the pancreas   better evaluated on prior imaging. Right hepatic lobe cyst. Left renal   cysts. Left renal atrophy.  BONES: Degenerative changes.    IMPRESSION:  No CT evidence of pneumonia. Emphysema. Secretions in the bilateral   mainstem bronchi.    Assessment :   89YO F PMH of HTN, COPD, COVID-19 in 2022, Nephrolithiasis, Osteoporosis on Raloxifene, Crohn's Disease, and GERD who presented with 5 days of flu-like symptoms, started as sore throat that rapidly got much worse, with hoarsness of her voice, then next day she started coughing with thick non bloody sputum that turned green in color, no fever, but had chills. In ED afebrile wbc 13K  RVP neg CT chest neg for pna. Noted Emphysema. Secretions in the bilateral mainstem bronchi.  Likely viral acute bronchitis with poss bacterial component   Copd exacerbation  Acute pharyngitis likely viral Throat culture neg  Leukocytosis Resolved  Hallucinations likely sec nebs- resolved  New onset Afib- resolved  Clinically stable    Plan :   Monitor off antibiotics  Steroid nebs per primary team  Trend temps and cbc  Pulm toileting  Cardio on case  Asp precautions  Stable from ID standpoint  Dc planning per primary team      Advance Directives- Full code  Current Medications are documented.   Drug-drug interactions reviewed.    Continue with present regiment.  Appropriate use of antibiotics and adverse effects reviewed.      I have discussed the above plan of care with patient in detail. She expressed understanding of the  treatment plan . Risks, benefits and alternatives discussed in detail. I have asked if she has any questions or concerns and appropriately addressed them to the best of my ability .    > 35 minutes were spent in direct patient care reviewing notes, medications ,labs data/ imaging , discussion with multidisciplinary team.    Thank you for allowing me to participate in care of your patient .    Mirna Henning MD  Infectious Disease  [phone]    Individualized infection control protocols for an individual patient based on their diagnosis and risks in order to reduce risk of disease transmission followed. Coordinating with  infection prevention and control team members to enable healthcare facility staff to safely care for patient.  Managing infection prevention and treatment protocols associated with transitions of care for complex patients.  In-depth patient chart review that entails going back farther in time and assessing the complete breadth of all health care interactions, with higher-level synthesis for complex diagnoses.  Engaging in complex medical decision-making associated with antimicrobial prescribing including considerations such as antimicrobial resistance patterns, emergence of new variants/strains, recent antibiotic exposure, interactions/complications from comorbidities including concurrent infections, public health considerations to minimize development of antimicrobial resistance, and emerging and re-emerging infections.       
Date/Time Patient Seen:  		  Referring MD:   Data Reviewed	       Patient is a 89y old  Female who presents with a chief complaint of Flu-like symptoms. (28 Apr 2025 11:45)      Subjective/HPI     PAST MEDICAL & SURGICAL HISTORY:  Hypertension    Osteoporosis    No significant past surgical history          Medication list         MEDICATIONS  (STANDING):  albuterol/ipratropium for Nebulization 3 milliLiter(s) Nebulizer every 6 hours  aspirin enteric coated 81 milliGRAM(s) Oral daily  atenolol  Tablet 25 milliGRAM(s) Oral daily  atorvastatin 10 milliGRAM(s) Oral at bedtime  benzonatate 200 milliGRAM(s) Oral three times a day  enoxaparin Injectable 30 milliGRAM(s) SubCutaneous every 24 hours  fluticasone propionate 50 MICROgram(s)/spray Nasal Spray 2 Spray(s) Both Nostrils two times a day  guaiFENesin  milliGRAM(s) Oral every 12 hours  lactated ringers. 1000 milliLiter(s) (125 mL/Hr) IV Continuous <Continuous>  levoFLOXacin  Tablet 500 milliGRAM(s) Oral every 24 hours  losartan 100 milliGRAM(s) Oral daily  NIFEdipine XL 30 milliGRAM(s) Oral daily  pantoprazole    Tablet 40 milliGRAM(s) Oral before breakfast  predniSONE   Tablet 20 milliGRAM(s) Oral daily  raloxifene 60 milliGRAM(s) Oral daily    MEDICATIONS  (PRN):  benzocaine/menthol Lozenge 1 Lozenge Oral every 3 hours PRN Sore Throat         Vitals log        ICU Vital Signs Last 24 Hrs  T(C): 36.5 (28 Apr 2025 23:29), Max: 36.7 (28 Apr 2025 13:45)  T(F): 97.7 (28 Apr 2025 23:29), Max: 98.1 (28 Apr 2025 13:45)  HR: 73 (29 Apr 2025 00:31) (72 - 80)  BP: 106/69 (28 Apr 2025 23:29) (106/69 - 122/53)  BP(mean): --  ABP: --  ABP(mean): --  RR: 17 (28 Apr 2025 23:29) (17 - 18)  SpO2: 93% (29 Apr 2025 00:31) (92% - 97%)    O2 Parameters below as of 29 Apr 2025 00:31  Patient On (Oxygen Delivery Method): room air                 Input and Output:  I&O's Detail    27 Apr 2025 07:01  -  28 Apr 2025 07:00  --------------------------------------------------------  IN:    Sodium Chloride 0.9% Bolus: 500 mL  Total IN: 500 mL    OUT:  Total OUT: 0 mL    Total NET: 500 mL          Lab Data                        13.2   8.82  )-----------( 218      ( 28 Apr 2025 07:30 )             40.6     04-28    142  |  108  |  21  ----------------------------<  150[H]  4.6   |  27  |  0.73    Ca    8.9      28 Apr 2025 07:30    TPro  7.8  /  Alb  3.4  /  TBili  0.6  /  DBili  x   /  AST  23  /  ALT  23  /  AlkPhos  130[H]  04-27            Review of Systems	      Objective     Physical Examination    heart s1s2  lung dc bs  head nc  head at      Pertinent Lab findings & Imaging      Yoshi:  NO   Adequate UO     I&O's Detail    27 Apr 2025 07:01  -  28 Apr 2025 07:00  --------------------------------------------------------  IN:    Sodium Chloride 0.9% Bolus: 500 mL  Total IN: 500 mL    OUT:  Total OUT: 0 mL    Total NET: 500 mL               Discussed with:     Cultures:	        Radiology                            
Date/Time Patient Seen:  		  Referring MD:   Data Reviewed	       Patient is a 89y old  Female who presents with a chief complaint of Flu-like symptoms. (29 Apr 2025 13:29)      Subjective/HPI     PAST MEDICAL & SURGICAL HISTORY:  Hypertension    Osteoporosis    No significant past surgical history          Medication list         MEDICATIONS  (STANDING):  albuterol/ipratropium for Nebulization 3 milliLiter(s) Nebulizer every 6 hours  aspirin enteric coated 81 milliGRAM(s) Oral daily  atenolol  Tablet 25 milliGRAM(s) Oral daily  atorvastatin 10 milliGRAM(s) Oral at bedtime  benzonatate 200 milliGRAM(s) Oral three times a day  enoxaparin Injectable 30 milliGRAM(s) SubCutaneous every 24 hours  fluticasone propionate 50 MICROgram(s)/spray Nasal Spray 2 Spray(s) Both Nostrils two times a day  guaiFENesin  milliGRAM(s) Oral every 12 hours  losartan 100 milliGRAM(s) Oral daily  NIFEdipine XL 30 milliGRAM(s) Oral daily  pantoprazole    Tablet 40 milliGRAM(s) Oral before breakfast  predniSONE   Tablet 20 milliGRAM(s) Oral daily  raloxifene 60 milliGRAM(s) Oral daily    MEDICATIONS  (PRN):  benzocaine/menthol Lozenge 1 Lozenge Oral every 3 hours PRN Sore Throat         Vitals log        ICU Vital Signs Last 24 Hrs  T(C): 36.5 (30 Apr 2025 04:56), Max: 36.7 (29 Apr 2025 21:09)  T(F): 97.7 (30 Apr 2025 04:56), Max: 98.1 (29 Apr 2025 21:09)  HR: 93 (30 Apr 2025 04:56) (72 - 93)  BP: 171/84 (30 Apr 2025 04:56) (114/65 - 171/84)  BP(mean): --  ABP: --  ABP(mean): --  RR: 18 (30 Apr 2025 04:56) (18 - 18)  SpO2: 94% (30 Apr 2025 04:56) (94% - 97%)    O2 Parameters below as of 30 Apr 2025 04:56  Patient On (Oxygen Delivery Method): nasal cannula                 Input and Output:  I&O's Detail      Lab Data                        13.2   8.82  )-----------( 218      ( 28 Apr 2025 07:30 )             40.6     04-28    142  |  108  |  21  ----------------------------<  150[H]  4.6   |  27  |  0.73    Ca    8.9      28 Apr 2025 07:30              Review of Systems	      Objective     Physical Examination  heart s1s2  lung dc bs  head nc  head at        Pertinent Lab findings & Imaging      Yoshi:  NO   Adequate UO     I&O's Detail           Discussed with:     Cultures:	        Radiology                            
Patient is a 89y old  Female who presents with a chief complaint of Flu-like symptoms. (29 Apr 2025 05:33)      INTERVAL HPI/OVERNIGHT EVENTS:  Patient seen and examined in am, breathing improved but complaining of hallucinations starting last night, still having some when closing eyes now (seeing people sitting on walls), is A+Ox3, no fever, chills    ROS reviewed and is otherwise negative        Vital Signs Last 24 Hrs  T(C): 36.3 (29 Apr 2025 10:52), Max: 36.7 (28 Apr 2025 13:45)  T(F): 97.4 (29 Apr 2025 10:52), Max: 98.1 (28 Apr 2025 13:45)  HR: 78 (29 Apr 2025 10:52) (72 - 85)  BP: 146/64 (29 Apr 2025 10:52) (106/69 - 146/64)  BP(mean): --  RR: 18 (29 Apr 2025 10:52) (17 - 18)  SpO2: 96% (29 Apr 2025 10:52) (92% - 97%)    Parameters below as of 29 Apr 2025 10:52  Patient On (Oxygen Delivery Method): nasal cannula  O2 Flow (L/min): 2      PHYSICAL EXAM:  GENERAL: NAD, well-groomed, elderly female  HEAD:  Atraumatic, Normocephalic  EYES: EOMI, PERRLA  ENMT: Moist mucous membranes, No lesions  NECK: Supple, No JVD  NERVOUS SYSTEM:  Alert & Oriented X3, Good concentration; All 4 extremities mobile, no gross sensory deficits.   CHEST/LUNG: fair air entry bilaterally; No rales, wheezing noted  HEART: Regular rate and rhythm; No murmurs, rubs, or gallops  ABDOMEN: Soft, Nontender, Nondistended; Bowel sounds present  EXTREMITIES:  + Pulses, No clubbing      MEDICATIONS  (STANDING):  albuterol/ipratropium for Nebulization 3 milliLiter(s) Nebulizer every 6 hours  aspirin enteric coated 81 milliGRAM(s) Oral daily  atenolol  Tablet 25 milliGRAM(s) Oral daily  atorvastatin 10 milliGRAM(s) Oral at bedtime  benzonatate 200 milliGRAM(s) Oral three times a day  enoxaparin Injectable 30 milliGRAM(s) SubCutaneous every 24 hours  fluticasone propionate 50 MICROgram(s)/spray Nasal Spray 2 Spray(s) Both Nostrils two times a day  guaiFENesin  milliGRAM(s) Oral every 12 hours  lactated ringers. 1000 milliLiter(s) (125 mL/Hr) IV Continuous <Continuous>  losartan 100 milliGRAM(s) Oral daily  NIFEdipine XL 30 milliGRAM(s) Oral daily  pantoprazole    Tablet 40 milliGRAM(s) Oral before breakfast  predniSONE   Tablet 20 milliGRAM(s) Oral daily  raloxifene 60 milliGRAM(s) Oral daily    MEDICATIONS  (PRN):  benzocaine/menthol Lozenge 1 Lozenge Oral every 3 hours PRN Sore Throat      Allergies    penicillins (Anaphylaxis)  epinephrine (Anaphylaxis; Hypotension; Short breath)    Intolerances          LABS:      Ca    8.9        28 Apr 2025 07:30        Urinalysis Basic - ( 28 Apr 2025 07:30 )    Color: x / Appearance: x / SG: x / pH: x  Gluc: 150 mg/dL / Ketone: x  / Bili: x / Urobili: x   Blood: x / Protein: x / Nitrite: x   Leuk Esterase: x / RBC: x / WBC x   Sq Epi: x / Non Sq Epi: x / Bacteria: x      CAPILLARY BLOOD GLUCOSE          RADIOLOGY & ADDITIONAL TESTS:        Care Discussed with Consultants/Other Providers:    Advanced Directives: [ ] DNR  [ ] No feeding tube  [ ] MOLST in chart  [ ] MOLST completed today  [ ] Unknown

## 2025-05-02 NOTE — DISCHARGE NOTE NURSING/CASE MANAGEMENT/SOCIAL WORK - FINANCIAL ASSISTANCE
[facility] provides services at a reduced cost to those who are determined to be eligible through [facility]’s financial assistance program. Information regarding [facility]’s financial assistance program can be found by going to [email or web address] or by calling [phone].

## 2025-05-02 NOTE — DISCHARGE NOTE NURSING/CASE MANAGEMENT/SOCIAL WORK - NSDPFAC_GEN_ALL_CORE
[facility] @ [address] [place] [address], [place] [address], phone [phone]

## 2025-05-02 NOTE — DISCHARGE NOTE PROVIDER - HOSPITAL COURSE
89 y/o F with PMH of HTN, COPD, COVID-19 in 2022, Nephrolithiasis, Osteoporosis on Raloxifene, Crohn's Disease, and GERD presented with flu-like symptoms.       Acute hypoxic respiratory failure 2/2 bronchitis with COPD. - breathing improved although still desaturating with ambulation  likely viral. WBC normalized,   - hadhallucinations, resolved after stopping levaquin. stable off abx  - procalcitonin 0.03. not indicative of bacterial infection  - continue prednisone 20 mg - can finish 5 day course - stopping 5/3  - stopped duonebs, started atrovent only  - stable for DC to [facility]. follow up with PCP in 1-2 weeks    New onset atrial fibrillation - with RVR  - now back in sinus rhythm  - continue metoprolol 25 bid  - started on eliquis  - Seen by cardio - Dr. greene  - echocardiogram showed normal EF, no significant AWMA or valve disease, mild pulmonary hypertension       HTN- procardia atenolol stopped, metoprolol started, continue losartan      Abnormal glucose level.   likely 2/2 steroids, stress, mild.       Osteoporosis.   on daily Raloxifene, continue.     HTN (hypertension).   on metoprolol and Cozaar, home atenolol and procardia stopped. loose control acceptable to avoid hypotension, dizziness    dvt ppx - lovenox

## 2025-05-02 NOTE — PROGRESS NOTE ADULT - REASON FOR ADMISSION
Flu-like symptoms.

## 2025-05-28 ENCOUNTER — TRANSCRIPTION ENCOUNTER (OUTPATIENT)
Age: 89
End: 2025-05-28

## 2025-07-01 ENCOUNTER — RX RENEWAL (OUTPATIENT)
Age: 89
End: 2025-07-01

## 2025-08-05 ENCOUNTER — RX RENEWAL (OUTPATIENT)
Age: 89
End: 2025-08-05

## 2025-08-12 ENCOUNTER — EMERGENCY (EMERGENCY)
Facility: HOSPITAL | Age: 89
LOS: 1 days | End: 2025-08-12
Attending: EMERGENCY MEDICINE | Admitting: EMERGENCY MEDICINE
Payer: MEDICARE

## 2025-08-12 VITALS
RESPIRATION RATE: 15 BRPM | TEMPERATURE: 98 F | DIASTOLIC BLOOD PRESSURE: 78 MMHG | OXYGEN SATURATION: 95 % | HEIGHT: 59 IN | SYSTOLIC BLOOD PRESSURE: 235 MMHG | HEART RATE: 68 BPM | WEIGHT: 113.98 LBS

## 2025-08-12 LAB
ALBUMIN SERPL ELPH-MCNC: 3.2 G/DL — LOW (ref 3.3–5)
ALP SERPL-CCNC: 121 U/L — HIGH (ref 30–120)
ALT FLD-CCNC: 17 U/L — SIGNIFICANT CHANGE UP (ref 10–60)
ANION GAP SERPL CALC-SCNC: 9 MMOL/L — SIGNIFICANT CHANGE UP (ref 5–17)
APTT BLD: 38.4 SEC — HIGH (ref 26.1–36.8)
AST SERPL-CCNC: 28 U/L — SIGNIFICANT CHANGE UP (ref 10–40)
BASOPHILS # BLD AUTO: 0.04 K/UL — SIGNIFICANT CHANGE UP (ref 0–0.2)
BASOPHILS NFR BLD AUTO: 0.5 % — SIGNIFICANT CHANGE UP (ref 0–2)
BILIRUB SERPL-MCNC: 0.6 MG/DL — SIGNIFICANT CHANGE UP (ref 0.2–1.2)
BUN SERPL-MCNC: 17 MG/DL — SIGNIFICANT CHANGE UP (ref 7–23)
CALCIUM SERPL-MCNC: 9.2 MG/DL — SIGNIFICANT CHANGE UP (ref 8.4–10.5)
CHLORIDE SERPL-SCNC: 103 MMOL/L — SIGNIFICANT CHANGE UP (ref 96–108)
CK MB BLD-MCNC: 1.7 % — SIGNIFICANT CHANGE UP (ref 0–3.5)
CK MB CFR SERPL CALC: 1.1 NG/ML — SIGNIFICANT CHANGE UP (ref 0–3.6)
CK SERPL-CCNC: 63 U/L — SIGNIFICANT CHANGE UP (ref 26–192)
CO2 SERPL-SCNC: 28 MMOL/L — SIGNIFICANT CHANGE UP (ref 22–31)
CREAT SERPL-MCNC: 0.82 MG/DL — SIGNIFICANT CHANGE UP (ref 0.5–1.3)
EGFR: 67 ML/MIN/1.73M2 — SIGNIFICANT CHANGE UP
EGFR: 67 ML/MIN/1.73M2 — SIGNIFICANT CHANGE UP
EOSINOPHIL # BLD AUTO: 0.22 K/UL — SIGNIFICANT CHANGE UP (ref 0–0.5)
EOSINOPHIL NFR BLD AUTO: 2.8 % — SIGNIFICANT CHANGE UP (ref 0–6)
GLUCOSE SERPL-MCNC: 96 MG/DL — SIGNIFICANT CHANGE UP (ref 70–99)
HCT VFR BLD CALC: 45.3 % — HIGH (ref 34.5–45)
HGB BLD-MCNC: 14.5 G/DL — SIGNIFICANT CHANGE UP (ref 11.5–15.5)
IMM GRANULOCYTES # BLD AUTO: 0.02 K/UL — SIGNIFICANT CHANGE UP (ref 0–0.07)
IMM GRANULOCYTES NFR BLD AUTO: 0.3 % — SIGNIFICANT CHANGE UP (ref 0–0.9)
INR BLD: 1.12 RATIO — SIGNIFICANT CHANGE UP (ref 0.85–1.16)
LYMPHOCYTES # BLD AUTO: 2.1 K/UL — SIGNIFICANT CHANGE UP (ref 1–3.3)
LYMPHOCYTES NFR BLD AUTO: 26.9 % — SIGNIFICANT CHANGE UP (ref 13–44)
MCHC RBC-ENTMCNC: 29 PG — SIGNIFICANT CHANGE UP (ref 27–34)
MCHC RBC-ENTMCNC: 32 G/DL — SIGNIFICANT CHANGE UP (ref 32–36)
MCV RBC AUTO: 90.6 FL — SIGNIFICANT CHANGE UP (ref 80–100)
MONOCYTES # BLD AUTO: 0.52 K/UL — SIGNIFICANT CHANGE UP (ref 0–0.9)
MONOCYTES NFR BLD AUTO: 6.6 % — SIGNIFICANT CHANGE UP (ref 2–14)
NEUTROPHILS # BLD AUTO: 4.92 K/UL — SIGNIFICANT CHANGE UP (ref 1.8–7.4)
NEUTROPHILS NFR BLD AUTO: 62.9 % — SIGNIFICANT CHANGE UP (ref 43–77)
NRBC # BLD AUTO: 0 K/UL — SIGNIFICANT CHANGE UP (ref 0–0)
NRBC # FLD: 0 K/UL — SIGNIFICANT CHANGE UP (ref 0–0)
NRBC BLD AUTO-RTO: 0 /100 WBCS — SIGNIFICANT CHANGE UP (ref 0–0)
NT-PROBNP SERPL-SCNC: 562 PG/ML — HIGH (ref 0–450)
PLATELET # BLD AUTO: 241 K/UL — SIGNIFICANT CHANGE UP (ref 150–400)
PMV BLD: 11.5 FL — SIGNIFICANT CHANGE UP (ref 7–13)
POTASSIUM SERPL-MCNC: 3.9 MMOL/L — SIGNIFICANT CHANGE UP (ref 3.5–5.3)
POTASSIUM SERPL-SCNC: 3.9 MMOL/L — SIGNIFICANT CHANGE UP (ref 3.5–5.3)
PROT SERPL-MCNC: 7.2 G/DL — SIGNIFICANT CHANGE UP (ref 6–8.3)
PROTHROM AB SERPL-ACNC: 13 SEC — SIGNIFICANT CHANGE UP (ref 9.9–13.4)
RBC # BLD: 5 M/UL — SIGNIFICANT CHANGE UP (ref 3.8–5.2)
RBC # FLD: 13.5 % — SIGNIFICANT CHANGE UP (ref 10.3–14.5)
SODIUM SERPL-SCNC: 140 MMOL/L — SIGNIFICANT CHANGE UP (ref 135–145)
TROPONIN I, HIGH SENSITIVITY RESULT: 7.7 NG/L — SIGNIFICANT CHANGE UP
WBC # BLD: 7.82 K/UL — SIGNIFICANT CHANGE UP (ref 3.8–10.5)
WBC # FLD AUTO: 7.82 K/UL — SIGNIFICANT CHANGE UP (ref 3.8–10.5)

## 2025-08-12 PROCEDURE — 70450 CT HEAD/BRAIN W/O DYE: CPT

## 2025-08-12 PROCEDURE — 82553 CREATINE MB FRACTION: CPT

## 2025-08-12 PROCEDURE — 70450 CT HEAD/BRAIN W/O DYE: CPT | Mod: 26

## 2025-08-12 PROCEDURE — 83880 ASSAY OF NATRIURETIC PEPTIDE: CPT

## 2025-08-12 PROCEDURE — 82550 ASSAY OF CK (CPK): CPT

## 2025-08-12 PROCEDURE — 84484 ASSAY OF TROPONIN QUANT: CPT

## 2025-08-12 PROCEDURE — 85730 THROMBOPLASTIN TIME PARTIAL: CPT

## 2025-08-12 PROCEDURE — 93010 ELECTROCARDIOGRAM REPORT: CPT

## 2025-08-12 PROCEDURE — 36415 COLL VENOUS BLD VENIPUNCTURE: CPT

## 2025-08-12 PROCEDURE — 85610 PROTHROMBIN TIME: CPT

## 2025-08-12 PROCEDURE — 99285 EMERGENCY DEPT VISIT HI MDM: CPT

## 2025-08-12 PROCEDURE — 80053 COMPREHEN METABOLIC PANEL: CPT

## 2025-08-12 PROCEDURE — 85025 COMPLETE CBC W/AUTO DIFF WBC: CPT

## 2025-08-12 RX ORDER — DRONEDARONE 400 MG/1
400 TABLET, FILM COATED ORAL ONCE
Refills: 0 | Status: COMPLETED | OUTPATIENT
Start: 2025-08-12 | End: 2025-08-12

## 2025-08-12 RX ORDER — METOPROLOL SUCCINATE 50 MG/1
25 TABLET, EXTENDED RELEASE ORAL ONCE
Refills: 0 | Status: COMPLETED | OUTPATIENT
Start: 2025-08-12 | End: 2025-08-12

## 2025-08-12 RX ADMIN — DRONEDARONE 400 MILLIGRAM(S): 400 TABLET, FILM COATED ORAL at 19:33

## 2025-08-12 RX ADMIN — METOPROLOL SUCCINATE 25 MILLIGRAM(S): 50 TABLET, EXTENDED RELEASE ORAL at 19:33

## 2025-08-12 RX ADMIN — Medication 10 MILLIGRAM(S): at 18:07

## 2025-08-13 VITALS
DIASTOLIC BLOOD PRESSURE: 52 MMHG | OXYGEN SATURATION: 95 % | SYSTOLIC BLOOD PRESSURE: 116 MMHG | RESPIRATION RATE: 19 BRPM | HEART RATE: 69 BPM

## 2025-08-13 LAB — TSH SERPL-MCNC: 2.07 UIU/ML — SIGNIFICANT CHANGE UP (ref 0.27–4.2)

## 2025-08-13 PROCEDURE — 84443 ASSAY THYROID STIM HORMONE: CPT

## 2025-08-13 PROCEDURE — 84484 ASSAY OF TROPONIN QUANT: CPT

## 2025-08-13 PROCEDURE — 85730 THROMBOPLASTIN TIME PARTIAL: CPT

## 2025-08-13 PROCEDURE — 96374 THER/PROPH/DIAG INJ IV PUSH: CPT

## 2025-08-13 PROCEDURE — 70450 CT HEAD/BRAIN W/O DYE: CPT

## 2025-08-13 PROCEDURE — 99285 EMERGENCY DEPT VISIT HI MDM: CPT | Mod: 25

## 2025-08-13 PROCEDURE — 93005 ELECTROCARDIOGRAM TRACING: CPT

## 2025-08-13 PROCEDURE — 36415 COLL VENOUS BLD VENIPUNCTURE: CPT

## 2025-08-13 PROCEDURE — 82553 CREATINE MB FRACTION: CPT

## 2025-08-13 PROCEDURE — 82550 ASSAY OF CK (CPK): CPT

## 2025-08-13 PROCEDURE — 85025 COMPLETE CBC W/AUTO DIFF WBC: CPT

## 2025-08-13 PROCEDURE — 85610 PROTHROMBIN TIME: CPT

## 2025-08-13 PROCEDURE — 80053 COMPREHEN METABOLIC PANEL: CPT

## 2025-08-13 PROCEDURE — 83880 ASSAY OF NATRIURETIC PEPTIDE: CPT

## 2025-08-13 RX ORDER — APIXABAN 5 MG/1
2.5 TABLET, FILM COATED ORAL ONCE
Refills: 0 | Status: COMPLETED | OUTPATIENT
Start: 2025-08-13 | End: 2025-08-13

## 2025-08-13 RX ADMIN — APIXABAN 2.5 MILLIGRAM(S): 5 TABLET, FILM COATED ORAL at 00:29
